# Patient Record
Sex: FEMALE | Race: WHITE
[De-identification: names, ages, dates, MRNs, and addresses within clinical notes are randomized per-mention and may not be internally consistent; named-entity substitution may affect disease eponyms.]

---

## 2017-03-13 ENCOUNTER — HOSPITAL ENCOUNTER (EMERGENCY)
Dept: HOSPITAL 17 - PHED | Age: 37
Discharge: HOME | End: 2017-03-13
Payer: MEDICAID

## 2017-03-13 VITALS — OXYGEN SATURATION: 99 %

## 2017-03-13 VITALS — WEIGHT: 171.96 LBS | HEIGHT: 67 IN | BODY MASS INDEX: 26.99 KG/M2

## 2017-03-13 VITALS
HEART RATE: 91 BPM | DIASTOLIC BLOOD PRESSURE: 86 MMHG | RESPIRATION RATE: 16 BRPM | TEMPERATURE: 98.4 F | SYSTOLIC BLOOD PRESSURE: 122 MMHG | OXYGEN SATURATION: 97 %

## 2017-03-13 VITALS — SYSTOLIC BLOOD PRESSURE: 100 MMHG | DIASTOLIC BLOOD PRESSURE: 65 MMHG

## 2017-03-13 DIAGNOSIS — B96.20: ICD-10-CM

## 2017-03-13 DIAGNOSIS — N76.0: Primary | ICD-10-CM

## 2017-03-13 DIAGNOSIS — R10.30: ICD-10-CM

## 2017-03-13 DIAGNOSIS — R11.0: ICD-10-CM

## 2017-03-13 LAB
ALP SERPL-CCNC: 92 U/L (ref 45–117)
ALT SERPL-CCNC: 40 U/L (ref 10–53)
ANION GAP SERPL CALC-SCNC: 6 MEQ/L (ref 5–15)
AST SERPL-CCNC: 13 U/L (ref 15–37)
BACTERIA #/AREA URNS HPF: (no result) /HPF
BASOPHILS # BLD AUTO: 0.1 TH/MM3 (ref 0–0.2)
BASOPHILS NFR BLD: 0.6 % (ref 0–2)
BILIRUB SERPL-MCNC: 0.5 MG/DL (ref 0.2–1)
BUN SERPL-MCNC: 17 MG/DL (ref 7–18)
CHLORIDE SERPL-SCNC: 106 MEQ/L (ref 98–107)
COLOR UR: YELLOW
COMMENT (UR): (no result)
CULTURE IF INDICATED: (no result)
EOSINOPHIL # BLD: 0.2 TH/MM3 (ref 0–0.4)
EOSINOPHIL NFR BLD: 2.3 % (ref 0–4)
ERYTHROCYTE [DISTWIDTH] IN BLOOD BY AUTOMATED COUNT: 11.8 % (ref 11.6–17.2)
GFR SERPLBLD BASED ON 1.73 SQ M-ARVRAT: 63 ML/MIN (ref 89–?)
GLUCOSE UR STRIP-MCNC: (no result) MG/DL
HCO3 BLD-SCNC: 28.8 MEQ/L (ref 21–32)
HCT VFR BLD CALC: 41.3 % (ref 35–46)
HEMO FLAGS: (no result)
HGB UR QL STRIP: (no result)
KETONES UR STRIP-MCNC: (no result) MG/DL
LEUKOCYTE ESTERASE UR QL STRIP: (no result) /HPF (ref 0–5)
LYMPHOCYTES # BLD AUTO: 2.8 TH/MM3 (ref 1–4.8)
LYMPHOCYTES NFR BLD AUTO: 26.7 % (ref 9–44)
MCH RBC QN AUTO: 30.6 PG (ref 27–34)
MCHC RBC AUTO-ENTMCNC: 34 % (ref 32–36)
MCV RBC AUTO: 90.1 FL (ref 80–100)
MONOCYTES NFR BLD: 5 % (ref 0–8)
MUCOUS THREADS #/AREA URNS LPF: (no result) /LPF
NEUTROPHILS # BLD AUTO: 6.7 TH/MM3 (ref 1.8–7.7)
NEUTROPHILS NFR BLD AUTO: 65.4 % (ref 16–70)
NITRITE UR QL STRIP: (no result)
PLATELET # BLD: 275 TH/MM3 (ref 150–450)
POTASSIUM SERPL-SCNC: 4 MEQ/L (ref 3.5–5.1)
RBC # BLD AUTO: 4.59 MIL/MM3 (ref 4–5.3)
SODIUM SERPL-SCNC: 141 MEQ/L (ref 136–145)
SP GR UR STRIP: 1.02 (ref 1–1.03)
SQUAMOUS #/AREA URNS HPF: (no result) /HPF (ref 0–5)
WBC # BLD AUTO: 10.3 TH/MM3 (ref 4–11)

## 2017-03-13 PROCEDURE — 87186 SC STD MICRODIL/AGAR DIL: CPT

## 2017-03-13 PROCEDURE — 85025 COMPLETE CBC W/AUTO DIFF WBC: CPT

## 2017-03-13 PROCEDURE — 96376 TX/PRO/DX INJ SAME DRUG ADON: CPT

## 2017-03-13 PROCEDURE — 87210 SMEAR WET MOUNT SALINE/INK: CPT

## 2017-03-13 PROCEDURE — 83690 ASSAY OF LIPASE: CPT

## 2017-03-13 PROCEDURE — 81001 URINALYSIS AUTO W/SCOPE: CPT

## 2017-03-13 PROCEDURE — 96375 TX/PRO/DX INJ NEW DRUG ADDON: CPT

## 2017-03-13 PROCEDURE — 80053 COMPREHEN METABOLIC PANEL: CPT

## 2017-03-13 PROCEDURE — 87086 URINE CULTURE/COLONY COUNT: CPT

## 2017-03-13 PROCEDURE — 96374 THER/PROPH/DIAG INJ IV PUSH: CPT

## 2017-03-13 PROCEDURE — 74176 CT ABD & PELVIS W/O CONTRAST: CPT

## 2017-03-13 PROCEDURE — 87077 CULTURE AEROBIC IDENTIFY: CPT

## 2017-03-13 PROCEDURE — 84703 CHORIONIC GONADOTROPIN ASSAY: CPT

## 2017-03-13 PROCEDURE — 99284 EMERGENCY DEPT VISIT MOD MDM: CPT

## 2017-03-13 NOTE — PD
HPI


Chief Complaint:  Gyn Problem/Complaint


Time Seen by Provider:  10:37


Travel History


International Travel<30 days:  No


Contact w/Intl Traveler<30days:  No


Traveled to known affect area:  No





History of Present Illness


HPI


The patient is a 36-year-old  female who presents emergency department 

for abdominal pain.  The patient states she developed abdominal pain last night

, and abdominal pain is located suprapubic and right lower quadrant, radiates 

to the right upper quadrant, associated with nausea, but she denies any 

vomiting.  The patient denies any vaginal discharge, dysuria, frequency, urgency

, or vaginal bleeding.  The patient does have an IUD in place, last missed a 

cycle was 4 years ago.  Patient is .  Patient did eat cookies last night, 

but does not have much of an appetite this morning.  She does have a history of 

previous  section, denies any known history of gallstones.  She does 

have a history of nephrolithiasis with different symptoms in the past.  

Symptoms are moderate, there are no current alleviating or exacerbating factors.





PFSH


Past Medical History


Anxiety:  Yes


Diabetes:  Yes


Patient Takes Glucophage:  Yes


Pregnant?:  Not Pregnant


LMP:  IUD-DOES NOT GET





Past Surgical History


 Section:  Yes





Social History


Alcohol Use:  Yes


Tobacco Use:  Yes


Substance Use:  No





Allergies-Medications


(Allergen,Severity, Reaction):  


Coded Allergies:  


     Latex (Verified  Allergy, Severe, 3/13/17)


Reported Meds & Prescriptions





Reported Meds & Active Scripts


Active


Metformin (Metformin HCl) 500 Mg Tab 500 Mg PO AS DIRECTED


     Take daily with evening meal.  May advance to taking twice a day (w/


     morning meal) after two weeks.


Klonopin (Clonazepam) 0.5 Mg Tab 0.5 Mg PO HS


Reported


Lortab (Hydrocodone-Acetaminophen) 5-325 Mg Tab 1 Tab PO Q4H PRN








Review of Systems


Except as stated in HPI:  all other systems reviewed are Neg


General / Constitutional:  No: Fever


Cardiovascular:  No: Chest Pain or Discomfort


Respiratory:  No: Shortness of Breath


Gastrointestinal:  Positive: Nausea, Abdominal Pain,  No: Vomiting, Diarrhea


Genitourinary:  Positive: Pelvic Pain,  No: Urgency, Frequency, Dysuria, 

Hematuria, Discharge, Vaginal Bleeding


Skin:  No Rash





Physical Exam


Narrative


GENERAL: Awake, alert, pleasant 36-year-old female who appears her stated age 

and is in no acute respiratory distress.


SKIN: Warm and dry.


HEAD: Atraumatic. Normocephalic. 


EYES: No injection or drainage.


ENT: No nasal bleeding or discharge.  Mucous membranes pink and moist.


NECK: Trachea midline. No JVD. 


CARDIOVASCULAR: Regular rate and rhythm.  No murmur appreciated.


RESPIRATORY: No accessory muscle use. Clear to auscultation. Breath sounds 

equal bilaterally. 


GASTROINTESTINAL: Abdomen soft, tender palpation right lower quadrant and 

suprapubic.  Mild guarding.  No rebound tenderness or rigidity noted.


Back: No CVA tenderness.


MUSCULOSKELETAL: No obvious deformities. No clubbing.  No cyanosis.  No edema. 


NEUROLOGICAL: Awake and alert. No obvious cranial nerve deficits.  Motor 

grossly within normal limits. Normal speech.


PSYCHIATRIC: Appropriate mood and affect; insight and judgment normal.





Data


Data


Last Documented VS





Vital Signs








  Date Time  Temp Pulse Resp B/P Pulse Ox O2 Delivery O2 Flow Rate FiO2


 


3/13/17 11:45     99 Room Air  


 


3/13/17 10:02 98.4 91 16 122/86    








Orders





 Complete Blood Count With Diff (3/13/17 10:58)


Comprehensive Metabolic Panel (3/13/17 10:58)


Lipase (3/13/17 10:58)


Urinalysis - C+S If Indicated (3/13/17 10:58)


Ct Abd/Pel W/O Iv Contrast (3/13/17 10:58)


Iv Access Insert/Monitor (3/13/17 10:58)


Ecg Monitoring (3/13/17 10:58)


Oximetry (3/13/17 10:58)


Morphine Inj (Morphine Inj) (3/13/17 11:00)


Ondansetron Inj (Zofran Inj) (3/13/17 11:00)


Sodium Chlor 0.9% 1000 Ml Inj (Ns 1000 M (3/13/17 10:58)


Sodium Chloride 0.9% Flush (Ns Flush) (3/13/17 11:00)


Ketorolac Inj (Toradol Inj) (3/13/17 11:00)


Ed Urine Pregnancytest Poc (3/13/17 10:58)


Urine Culture (3/13/17 11:15)


Wet Prep Profile (3/13/17 12:04)


Morphine Inj (Morphine Inj) (3/13/17 12:15)





Labs





 Laboratory Tests








Test 3/13/17 3/13/17





 11:15 12:17


 


White Blood Count 10.3 TH/MM3 


 


Red Blood Count 4.59 MIL/MM3 


 


Hemoglobin 14.0 GM/DL 


 


Hematocrit 41.3 % 


 


Mean Corpuscular Volume 90.1 FL 


 


Mean Corpuscular Hemoglobin 30.6 PG 


 


Mean Corpuscular Hemoglobin 34.0 % 





Concent  


 


Red Cell Distribution Width 11.8 % 


 


Platelet Count 275 TH/MM3 


 


Mean Platelet Volume 8.2 FL 


 


Neutrophils (%) (Auto) 65.4 % 


 


Lymphocytes (%) (Auto) 26.7 % 


 


Monocytes (%) (Auto) 5.0 % 


 


Eosinophils (%) (Auto) 2.3 % 


 


Basophils (%) (Auto) 0.6 % 


 


Neutrophils # (Auto) 6.7 TH/MM3 


 


Lymphocytes # (Auto) 2.8 TH/MM3 


 


Monocytes # (Auto) 0.5 TH/MM3 


 


Eosinophils # (Auto) 0.2 TH/MM3 


 


Basophils # (Auto) 0.1 TH/MM3 


 


CBC Comment DIFF FINAL  


 


Differential Comment   


 


Urine Color YELLOW  


 


Urine Turbidity CLEAR  


 


Urine pH 5.5  


 


Urine Specific Gravity 1.021  


 


Urine Protein NEG mg/dL 


 


Urine Glucose (UA) NEG mg/dL 


 


Urine Ketones NEG mg/dL 


 


Urine Occult Blood NEG  


 


Urine Nitrite POS  


 


Urine Bilirubin NEG  


 


Urine Leukocyte Esterase NEG  


 


Urine WBC 0-2 /hpf 


 


Urine Squamous Epithelial 0-5 /hpf 





Cells  


 


Urine Bacteria MOD /hpf 


 


Urine Mucus OCC /lpf 


 


Microscopic Urinalysis Comment CULTURE 





 INDICATED 


 


Sodium Level 141 MEQ/L 


 


Potassium Level 4.0 MEQ/L 


 


Chloride Level 106 MEQ/L 


 


Carbon Dioxide Level 28.8 MEQ/L 


 


Anion Gap 6 MEQ/L 


 


Blood Urea Nitrogen 17 MG/DL 


 


Creatinine 1.00 MG/DL 


 


Estimat Glomerular Filtration 63 ML/MIN 





Rate  


 


Random Glucose 138 MG/DL 


 


Calcium Level 8.8 MG/DL 


 


Total Bilirubin 0.5 MG/DL 


 


Aspartate Amino Transf 13 U/L 





(AST/SGOT)  


 


Alanine Aminotransferase 40 U/L 





(ALT/SGPT)  


 


Alkaline Phosphatase 92 U/L 


 


Total Protein 7.4 GM/DL 


 


Albumin 3.9 GM/DL 


 


Lipase 118 U/L 


 


Clue Cells (Wet Prep)  PRESENT 


 


Vaginal Trichomonas (Wet Prep)  NONE SEEN 


 


Vaginal Yeast (Wet Prep)  NONE SEEN 











MDM


Medical Decision Making


Medical Screen Exam Complete:  Yes


Emergency Medical Condition:  Yes


Medical Record Reviewed:  Yes


Interpretation(s)





 Laboratory Tests








Test 3/13/17 3/13/17





 11:15 12:17


 


White Blood Count 10.3 TH/MM3 


 


Red Blood Count 4.59 MIL/MM3 


 


Hemoglobin 14.0 GM/DL 


 


Hematocrit 41.3 % 


 


Mean Corpuscular Volume 90.1 FL 


 


Mean Corpuscular Hemoglobin 30.6 PG 


 


Mean Corpuscular Hemoglobin 34.0 % 





Concent  


 


Red Cell Distribution Width 11.8 % 


 


Platelet Count 275 TH/MM3 


 


Mean Platelet Volume 8.2 FL 


 


Neutrophils (%) (Auto) 65.4 % 


 


Lymphocytes (%) (Auto) 26.7 % 


 


Monocytes (%) (Auto) 5.0 % 


 


Eosinophils (%) (Auto) 2.3 % 


 


Basophils (%) (Auto) 0.6 % 


 


Neutrophils # (Auto) 6.7 TH/MM3 


 


Lymphocytes # (Auto) 2.8 TH/MM3 


 


Monocytes # (Auto) 0.5 TH/MM3 


 


Eosinophils # (Auto) 0.2 TH/MM3 


 


Basophils # (Auto) 0.1 TH/MM3 


 


CBC Comment DIFF FINAL  


 


Differential Comment   


 


Urine Color YELLOW  


 


Urine Turbidity CLEAR  


 


Urine pH 5.5  


 


Urine Specific Gravity 1.021  


 


Urine Protein NEG mg/dL 


 


Urine Glucose (UA) NEG mg/dL 


 


Urine Ketones NEG mg/dL 


 


Urine Occult Blood NEG  


 


Urine Nitrite POS  


 


Urine Bilirubin NEG  


 


Urine Leukocyte Esterase NEG  


 


Urine WBC 0-2 /hpf 


 


Urine Squamous Epithelial 0-5 /hpf 





Cells  


 


Urine Bacteria MOD /hpf 


 


Urine Mucus OCC /lpf 


 


Microscopic Urinalysis Comment CULTURE 





 INDICATED 


 


Sodium Level 141 MEQ/L 


 


Potassium Level 4.0 MEQ/L 


 


Chloride Level 106 MEQ/L 


 


Carbon Dioxide Level 28.8 MEQ/L 


 


Anion Gap 6 MEQ/L 


 


Blood Urea Nitrogen 17 MG/DL 


 


Creatinine 1.00 MG/DL 


 


Estimat Glomerular Filtration 63 ML/MIN 





Rate  


 


Random Glucose 138 MG/DL 


 


Calcium Level 8.8 MG/DL 


 


Total Bilirubin 0.5 MG/DL 


 


Aspartate Amino Transf 13 U/L 





(AST/SGOT)  


 


Alanine Aminotransferase 40 U/L 





(ALT/SGPT)  


 


Alkaline Phosphatase 92 U/L 


 


Total Protein 7.4 GM/DL 


 


Albumin 3.9 GM/DL 


 


Lipase 118 U/L 


 


Clue Cells (Wet Prep)  PRESENT 


 


Vaginal Trichomonas (Wet Prep)  NONE SEEN 


 


Vaginal Yeast (Wet Prep)  NONE SEEN 








Differential Diagnosis


Differential diagnosis includes appendicitis, ovarian torsion, ovarian cyst, 

ectopic pregnancy, pyelonephritis, nephrolithiasis, UTI, PID, cervicitis.


Narrative Course


IV was established, labs are drawn and sent, and the patient was placed on 

cardiac telemetry monitoring and continuous pulse oximetry monitoring.  Bedside 

UA pregnancy test was obtained and UA was sent to lab.  The patient was 

administered morphine, Zofran, Toradol, and IV fluids for her symptoms.  

Laboratory evaluation is essentially unremarkable.  UA does reveal nitrites and 

bacteria, but no wbc's.  CT of the abdomen and pelvis is negative, no evidence 

of appendicitis, nephrolithiasis, diverticulitis, or free fluid.  Pelvic exam 

was performed, IUD appears in place, cervical os is closed with IUD string 

visible, brown discharge in the posterior vaginal vault, wet prep was sent to 

lab.  Wet prep is positive for bacterial vaginosis, therefore, patient will be 

treated with Flagyl twice a day.  She is advised no drinking alcohol while on 

Flagyl, pain medications as directed, to follow-up with her primary physician.  

The patient will be provided a copy of her CT and lab results at discharge.





Diagnosis





 Primary Impression:  


 Bacterial vaginosis


 Additional Impression:  


 Abdominal pain


 Qualified Code:  R10.30 - Lower abdominal pain


Patient Instructions:  General Instructions





***Additional Instructions:


Medications as directed.  Follow-up with her primary physician.  Return if 

symptoms worsen or progress.


***Med/Other Pt SpecificInfo:  Prescription(s) given


Scripts


Hydrocodone-Acetaminophen (Norco)5-325 mg Tab1 Tab PO Q6H PRN (PAIN) #12 TAB  

Ref 0


   Prov:Jc Donald MD         3/13/17 


Ibuprofen 600 Mg Omn255 Mg PO Q6H PRN (Pain/Inflammation) #20 TAB  Ref 0


   Prov:Jc Donald MD         3/13/17 


Metronidazole (Flagyl)500 Mg Ctf826 Mg PO BID  7 Days  Ref 0


   Prov:Jc Donald MD         3/13/17


Disposition:  01 DISCHARGE HOME


Condition:  Stable








Jc Donald MD Mar 13, 2017 11:02

## 2017-04-14 ENCOUNTER — HOSPITAL ENCOUNTER (EMERGENCY)
Dept: HOSPITAL 17 - PHED | Age: 37
Discharge: HOME | End: 2017-04-14
Payer: MEDICAID

## 2017-04-14 VITALS
OXYGEN SATURATION: 100 % | HEART RATE: 76 BPM | RESPIRATION RATE: 16 BRPM | DIASTOLIC BLOOD PRESSURE: 67 MMHG | SYSTOLIC BLOOD PRESSURE: 125 MMHG

## 2017-04-14 VITALS — WEIGHT: 168.65 LBS | BODY MASS INDEX: 26.47 KG/M2 | HEIGHT: 67 IN

## 2017-04-14 VITALS
SYSTOLIC BLOOD PRESSURE: 151 MMHG | HEART RATE: 101 BPM | OXYGEN SATURATION: 97 % | RESPIRATION RATE: 18 BRPM | DIASTOLIC BLOOD PRESSURE: 117 MMHG | TEMPERATURE: 98.6 F

## 2017-04-14 DIAGNOSIS — E11.9: ICD-10-CM

## 2017-04-14 DIAGNOSIS — F17.210: ICD-10-CM

## 2017-04-14 DIAGNOSIS — J40: Primary | ICD-10-CM

## 2017-04-14 DIAGNOSIS — R06.02: ICD-10-CM

## 2017-04-14 LAB
ALP SERPL-CCNC: 102 U/L (ref 45–117)
ALT SERPL-CCNC: 45 U/L (ref 10–53)
ANION GAP SERPL CALC-SCNC: 11 MEQ/L (ref 5–15)
AST SERPL-CCNC: 17 U/L (ref 15–37)
BASOPHILS # BLD AUTO: 0 TH/MM3 (ref 0–0.2)
BASOPHILS NFR BLD: 0.3 % (ref 0–2)
BETA HCG QUANT: (no result) MIU/ML (ref 0–5)
BILIRUB SERPL-MCNC: 0.7 MG/DL (ref 0.2–1)
BUN SERPL-MCNC: 16 MG/DL (ref 7–18)
CHLORIDE SERPL-SCNC: 105 MEQ/L (ref 98–107)
EOSINOPHIL # BLD: 0.2 TH/MM3 (ref 0–0.4)
EOSINOPHIL NFR BLD: 1.1 % (ref 0–4)
ERYTHROCYTE [DISTWIDTH] IN BLOOD BY AUTOMATED COUNT: 11.8 % (ref 11.6–17.2)
GFR SERPLBLD BASED ON 1.73 SQ M-ARVRAT: 68 ML/MIN (ref 89–?)
HCO3 BLD-SCNC: 24 MEQ/L (ref 21–32)
HCT VFR BLD CALC: 40.1 % (ref 35–46)
HEMO FLAGS: (no result)
LYMPHOCYTES # BLD AUTO: 2.9 TH/MM3 (ref 1–4.8)
LYMPHOCYTES NFR BLD AUTO: 20.7 % (ref 9–44)
MCH RBC QN AUTO: 31 PG (ref 27–34)
MCHC RBC AUTO-ENTMCNC: 34.6 % (ref 32–36)
MCV RBC AUTO: 89.7 FL (ref 80–100)
MONOCYTES NFR BLD: 5.1 % (ref 0–8)
NEUTROPHILS # BLD AUTO: 10.4 TH/MM3 (ref 1.8–7.7)
NEUTROPHILS NFR BLD AUTO: 72.8 % (ref 16–70)
PLATELET # BLD: 277 TH/MM3 (ref 150–450)
POTASSIUM SERPL-SCNC: 4 MEQ/L (ref 3.5–5.1)
RBC # BLD AUTO: 4.47 MIL/MM3 (ref 4–5.3)
SODIUM SERPL-SCNC: 140 MEQ/L (ref 136–145)
WBC # BLD AUTO: 14.2 TH/MM3 (ref 4–11)

## 2017-04-14 PROCEDURE — 71020: CPT

## 2017-04-14 PROCEDURE — 83690 ASSAY OF LIPASE: CPT

## 2017-04-14 PROCEDURE — 85025 COMPLETE CBC W/AUTO DIFF WBC: CPT

## 2017-04-14 PROCEDURE — 99283 EMERGENCY DEPT VISIT LOW MDM: CPT

## 2017-04-14 PROCEDURE — 80053 COMPREHEN METABOLIC PANEL: CPT

## 2017-04-14 PROCEDURE — 96361 HYDRATE IV INFUSION ADD-ON: CPT

## 2017-04-14 PROCEDURE — 96374 THER/PROPH/DIAG INJ IV PUSH: CPT

## 2017-04-14 PROCEDURE — 84702 CHORIONIC GONADOTROPIN TEST: CPT

## 2017-04-14 NOTE — RADHPO
EXAM DATE/TIME:  04/14/2017 10:54 

 

[facility] COMPARISON:     

CHEST SINGLE AP, March 16, 2016, 19:41.

 

                     

INDICATIONS :     

Shortness of breath, cough.

                     

 

MEDICAL HISTORY :     

Diabetes mellitus type II.       Smoker.   

 

SURGICAL HISTORY :     

None.   

 

ENCOUNTER:     

Initial                                        

 

ACUITY:     

1 month      

 

PAIN SCORE:     

0/10

 

LOCATION:     

Bilateral chest 

 

FINDINGS:     

PA and lateral views of the chest demonstrate a normal-sized cardiac silhouette. There is no effusion
, consolidation, or pneumothorax. The bones and soft tissues demonstrate no acute abnormality.

 

CONCLUSION:     Normal chest x-ray

 

 

 

 Edilberto Prince MD on April 14, 2017 at 11:22           

Board Certified Radiologist.

 This report was verified electronically.

## 2017-04-14 NOTE — PD
HPI


Chief Complaint:  Cold / Flu Symptoms


Time Seen by Provider:  10:34


Travel History


International Travel<30 days:  No


Contact w/Intl Traveler<30days:  No


Traveled to known affect area:  No





History of Present Illness


HPI


So 36-year-old woman who presents to the emergency department complaining of 

being sick for the past month and a half.  She started to develop cough, sore 

throat a little bit of fevers initially.  Fevers are resolved.  She has 

persistent hard cough.  She also has a lot of congestion.  Cough is productive 

of thick phlegm.  She also developed some right flank pain this morning.  She 

lost her voice today.  She has some vomiting in the morning that she attributes 

to the thick phlegm.  She otherwise has been feeling generally ill.  Today she 

also started to get some irritation and inflammation in her left eye.  No sick 

contacts.  Only medical history is diabetes.  No other complaints.





History


Past Medical History


*** Narrative Medical


Diabetes


Tetanus Vaccination:  Unknown


Influenza Vaccination:  No


LMP:  IUD/not menstruating





Social History


Alcohol Use:  Yes (Rarely)


Tobacco Use:  Yes (1/2 PPD)





Allergies-Medications


(Allergen,Severity, Reaction):  


Coded Allergies:  


     Latex (Verified  Allergy, Severe, 4/14/17)


Reported Meds & Prescriptions





Reported Meds & Active Scripts


Active


Erythromycin Opth Oint 5 Mg/Gm Oint 1 Applic LEFT EYE QID


Zithromax Z-Roldan (Azithromycin) 250 Mg Dspk 250 Mg PO AS DIRECTED


     500 MG (2 tabs) day 1, then 1 tab days 2-5.


Naprosyn (Naproxen) 500 Mg Tab 500 Mg PO BID PRN


Metformin (Metformin HCl) 500 Mg Tab 500 Mg PO AS DIRECTED


     Take daily with evening meal.  May advance to taking twice a day (w/


     morning meal) after two weeks.








Review of Systems


Except as stated in HPI:  all other systems reviewed are Neg





Physical Exam


Narrative


GENERAL: Well-appearing 36-year-old woman, no acute distress.


SKIN: Focused skin assessment warm/dry.


HEAD: Atraumatic. Normocephalic. 


EYES: Left eye with a little bit of scleral injection.


ENT: No nasal bleeding or discharge.  Mucous membranes pink and moist.


NECK: Trachea midline. No JVD. 


CARDIOVASCULAR: Regular rate and rhythm.  No murmur appreciated.


RESPIRATORY: No accessory muscle use. Clear to auscultation. Breath sounds 

equal bilaterally. 


GASTROINTESTINAL: Abdomen flat and soft.  Some tenderness to palpation of the 

right costal margin.


NEUROLOGICAL: Awake and alert. No obvious cranial nerve deficits.  Motor 

grossly within normal limits. Normal speech.


PSYCHIATRIC: Appropriate mood and affect; insight and judgment normal.





Data


Data


Last Documented VS





Vital Signs








  Date Time  Temp Pulse Resp B/P Pulse Ox O2 Delivery O2 Flow Rate FiO2


 


4/14/17 10:35  101 18  97 Room Air  


 


4/14/17 10:29 98.6   151/117    








Orders





 Complete Blood Count With Diff (4/14/17 10:49)


Comprehensive Metabolic Panel (4/14/17 10:49)


Lipase (4/14/17 10:49)


Iv Access Insert/Monitor (4/14/17 10:49)


Beta Hcg (Quant/Titer) (4/14/17 10:49)


Ed Poc Ultrasound (4/14/17 )


Chest, Pa & Lat (4/14/17 )


Sodium Chlor 0.9% 1000 Ml Inj (Ns 1000 M (4/14/17 11:00)


Ketorolac Inj (Toradol Inj) (4/14/17 11:15)





Labs








 Laboratory Tests








Test 4/14/17





 10:55


 


White Blood Count 14.2 TH/MM3


 


Red Blood Count 4.47 MIL/MM3


 


Hemoglobin 13.9 GM/DL


 


Hematocrit 40.1 %


 


Mean Corpuscular Volume 89.7 FL


 


Mean Corpuscular Hemoglobin 31.0 PG


 


Mean Corpuscular Hemoglobin 34.6 %





Concent 


 


Red Cell Distribution Width 11.8 %


 


Platelet Count 277 TH/MM3


 


Mean Platelet Volume 8.3 FL


 


Neutrophils (%) (Auto) 72.8 %


 


Lymphocytes (%) (Auto) 20.7 %


 


Monocytes (%) (Auto) 5.1 %


 


Eosinophils (%) (Auto) 1.1 %


 


Basophils (%) (Auto) 0.3 %


 


Neutrophils # (Auto) 10.4 TH/MM3


 


Lymphocytes # (Auto) 2.9 TH/MM3


 


Monocytes # (Auto) 0.7 TH/MM3


 


Eosinophils # (Auto) 0.2 TH/MM3


 


Basophils # (Auto) 0.0 TH/MM3


 


CBC Comment DIFF FINAL 


 


Differential Comment  


 


Sodium Level 140 MEQ/L


 


Potassium Level 4.0 MEQ/L


 


Chloride Level 105 MEQ/L


 


Carbon Dioxide Level 24.0 MEQ/L


 


Anion Gap 11 MEQ/L


 


Blood Urea Nitrogen 16 MG/DL


 


Creatinine 0.93 MG/DL


 


Estimat Glomerular Filtration 68 ML/MIN





Rate 


 


Random Glucose 155 MG/DL


 


Calcium Level 9.0 MG/DL


 


Total Bilirubin 0.7 MG/DL


 


Aspartate Amino Transf 17 U/L





(AST/SGOT) 


 


Alanine Aminotransferase 45 U/L





(ALT/SGPT) 


 


Alkaline Phosphatase 102 U/L


 


Total Protein 7.5 GM/DL


 


Albumin 3.9 GM/DL


 


Lipase 123 U/L


 


Human Chorionic Gonadotropin, LESS THAN 1





Quant MIU/ML














MDM


Medical Decision Making


Medical Screen Exam Complete:  Yes


Emergency Medical Condition:  Yes


Interpretation(s)


LABS:


CBC remarkable for mild leukocytosis.


CMP is unremarkable.


Lipase is normal


HCG negative





Chest x-ray negative


Differential Diagnosis


URI, cough, congestion, bronchitis, other


Narrative Course


Medical decision making





36-year-old woman presents to the emergency department complaining of a month 

and a half of cough congestion sore throat associated with now some loss of 

voice and some mild conjunctival injection.  Suspect bronchitis and upper 

respiratory infection.  Symptoms been ongoing for month and a half.  She has 

some tenderness in the right upper quadrant.  I checked an ultrasound the 

gallbladder which is unremarkable.  We'll check an x-ray, labs, IV fluids, 

likely antibiotic treatment for acute bronchitis.  We'll give her prescription 

for erythromycin of her conjunctivitis symptoms worsen.





Diagnosis





 Primary Impression:  


 Bronchitis





***Additional Instructions:


Take Naprosyn as needed for pain and body aches.





Take azithromycin as prescribed.





Use erythromycin eye ointment if you have any worsening eye redness, or eye 

drainage.





Drink plenty of fluids stay well-hydrated.





Return to the emergency department new or worsening symptoms.





Follow-up with your primary doctor in 1-10 days every not feeling completely 

well.


***Med/Other Pt SpecificInfo:  Prescription(s) given


Scripts


Erythromycin Opth Oint 5 Mg/Gm Oint1 Applic LEFT EYE QID  #1 TUBE


   Prov:Brad Matute MD         4/14/17 


Azithromycin (Zithromax Z-Roldan)250 Mg [id number] Mg PO AS DIRECTED  #1 DSPK


   500 MG (2 tabs) day 1, then 1 tab days 2-5.


   Prov:Brad Matute MD         4/14/17 


Naproxen (Naprosyn)500 Mg Dpp594 Mg PO BID PRN (PAIN SCALE 1 TO 10) #20 TAB


   Prov:Brad Matute MD         4/14/17


Disposition:  01 DISCHARGE HOME


Condition:  Stable








Brad Matute MD Apr 14, 2017 11:09

## 2017-06-03 ENCOUNTER — HOSPITAL ENCOUNTER (EMERGENCY)
Dept: HOSPITAL 17 - NEPC | Age: 37
LOS: 1 days | Discharge: HOME | End: 2017-06-04
Payer: MEDICAID

## 2017-06-03 VITALS
OXYGEN SATURATION: 98 % | DIASTOLIC BLOOD PRESSURE: 86 MMHG | RESPIRATION RATE: 18 BRPM | HEART RATE: 112 BPM | TEMPERATURE: 98.2 F | SYSTOLIC BLOOD PRESSURE: 127 MMHG

## 2017-06-03 VITALS — WEIGHT: 187.39 LBS | HEIGHT: 66 IN | BODY MASS INDEX: 30.12 KG/M2

## 2017-06-03 DIAGNOSIS — T42.4X2A: ICD-10-CM

## 2017-06-03 DIAGNOSIS — S00.03XA: Primary | ICD-10-CM

## 2017-06-03 DIAGNOSIS — F41.9: ICD-10-CM
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
## 2017-06-03 DIAGNOSIS — F17.210: ICD-10-CM

## 2017-06-03 DIAGNOSIS — F43.25: ICD-10-CM

## 2017-06-03 LAB
AMPHETAMINE, URINE: (no result)
BARBITURATES, URINE: (no result)
BASOPHILS # BLD AUTO: 0.1 TH/MM3 (ref 0–0.2)
BASOPHILS NFR BLD: 0.3 % (ref 0–2)
COCAINE UR-MCNC: (no result) NG/ML
EOSINOPHIL # BLD: 0.1 TH/MM3 (ref 0–0.4)
EOSINOPHIL NFR BLD: 0.5 % (ref 0–4)
ERYTHROCYTE [DISTWIDTH] IN BLOOD BY AUTOMATED COUNT: 12.8 % (ref 11.6–17.2)
HCT VFR BLD CALC: 42.2 % (ref 35–46)
HEMO FLAGS: (no result)
LYMPHOCYTES # BLD AUTO: 2.1 TH/MM3 (ref 1–4.8)
LYMPHOCYTES NFR BLD AUTO: 9.4 % (ref 9–44)
MCH RBC QN AUTO: 31.3 PG (ref 27–34)
MCHC RBC AUTO-ENTMCNC: 34.5 % (ref 32–36)
MCV RBC AUTO: 90.8 FL (ref 80–100)
MONOCYTES NFR BLD: 5 % (ref 0–8)
NEUTROPHILS # BLD AUTO: 19 TH/MM3 (ref 1.8–7.7)
NEUTROPHILS NFR BLD AUTO: 84.8 % (ref 16–70)
PLATELET # BLD: 243 TH/MM3 (ref 150–450)
RBC # BLD AUTO: 4.64 MIL/MM3 (ref 4–5.3)
WBC # BLD AUTO: 22.5 TH/MM3 (ref 4–11)

## 2017-06-03 PROCEDURE — 72125 CT NECK SPINE W/O DYE: CPT

## 2017-06-03 PROCEDURE — 70450 CT HEAD/BRAIN W/O DYE: CPT

## 2017-06-03 PROCEDURE — 85025 COMPLETE CBC W/AUTO DIFF WBC: CPT

## 2017-06-03 PROCEDURE — 80307 DRUG TEST PRSMV CHEM ANLYZR: CPT

## 2017-06-03 PROCEDURE — 81001 URINALYSIS AUTO W/SCOPE: CPT

## 2017-06-03 PROCEDURE — 80053 COMPREHEN METABOLIC PANEL: CPT

## 2017-06-03 PROCEDURE — 84703 CHORIONIC GONADOTROPIN ASSAY: CPT

## 2017-06-03 NOTE — PD
HPI


.


Head injury


Chief Complaint:  head injury


Time Seen by Provider:  23:02


Travel History


International Travel<30 days:  No


Contact w/Intl Traveler<30days:  No





History of Present Illness


HPI


Patient presents to us via [facility] after a reported head injury.  She states that 

she was standing on her bed and she fell and struck her head on the ceiling 

fan.  She has reportedly had several syncopal events since this.





The patient is very angry and is not willing to cooperate for history and 

physical.





Family reports that the patient old gun to her head threatening to kill 

herself.  Family further reports that she took too many of her benzodiazepine 

pills.





PFSH


Past Medical History


Hx Anticoagulant Therapy:  No


Anxiety:  Yes


Diabetes:  Yes (Type 2)





Past Surgical History


 Section:  Yes





Social History


Alcohol Use:  Yes (Rarely)


Tobacco Use:  Yes (1/2 PPD)


Substance Use:  No





Allergies-Medications


(Allergen,Severity, Reaction):  


Coded Allergies:  


     Latex (Verified  Allergy, Severe, 17)


Reported Meds & Prescriptions





Reported Meds & Active Scripts


Active


Januvia (Sitagliptin Phosphate) 100 Mg Tab 100 Mg PO DAILY


Reported


Klonopin (Clonazepam) 0.5 Mg Tab 0.5 Mg PO HS








Review of Systems


ROS Limitations:  Uncooperative





Physical Exam


Narrative


GENERAL: The patient is awake and alert.  She is very angry and uncooperative.


SKIN: Warm and dry.  Some superficial scrapes noted on the left wrist.


HEAD: Contusion palpated on the left side of her scalp.  Normocephalic. 


EYES: Pupils equal and round.  Extraocular movements are intact.


ENT: No nasal bleeding or discharge.  Mucous membranes pink and moist.


NECK: Trachea midline.  Neck is currently immobilized with a cervical collar.


CARDIOVASCULAR: Regular rate and rhythm.  Heart sounds are normal.


RESPIRATORY: No accessory muscle use. 


GASTROINTESTINAL: Abdomen soft, non-tender, nondistended. 


MUSCULOSKELETAL: No obvious deformities.   No edema.  She does have some 

tenderness to percussion in her low back.


NEUROLOGICAL: Awake and alert. No obvious cranial nerve deficits.  Moves all 4 

extremities equally.


PSYCHIATRIC: Very angry and uncooperative.  Patient denies any homicidal or 

suicidal ideation.





Data


Data


Last Documented VS





Vital Signs








  Date Time  Temp Pulse Resp B/P Pulse Ox O2 Delivery O2 Flow Rate FiO2


 


17 01:34  58 18 121/76 98 Room Air  


 


6/3/17 23:07 98.2       








Orders





 Complete Blood Count With Diff (6/3/17 23:02)


Comprehensive Metabolic Panel (6/3/17 23:02)


Psych Screen (6/3/17 23:02)


Restraints Non-Violent IRAJ.Q3H (6/3/17 23:02)


Drug Screen, Random Urine (6/3/17 23:02)


Alcohol (Ethanol) (6/3/17 23:02)


Ct Brain W/O Iv Contrast(Rout) (6/3/17 23:02)


Ct Cerv Spine W/O Contrast (6/3/17 23:02)


Ed Urine Pregnancytest Poc (6/3/17 23:02)


Urinalysis - C+S If Indicated (17 00:13)





Labs





 Laboratory Tests








Test 6/3/17





 23:10


 


White Blood Count 22.5 TH/MM3


 


Red Blood Count 4.64 MIL/MM3


 


Hemoglobin 14.5 GM/DL


 


Hematocrit 42.2 %


 


Mean Corpuscular Volume 90.8 FL


 


Mean Corpuscular Hemoglobin 31.3 PG


 


Mean Corpuscular Hemoglobin 34.5 %





Concent 


 


Red Cell Distribution Width 12.8 %


 


Platelet Count 243 TH/MM3


 


Mean Platelet Volume 9.1 FL


 


Neutrophils (%) (Auto) 84.8 %


 


Lymphocytes (%) (Auto) 9.4 %


 


Monocytes (%) (Auto) 5.0 %


 


Eosinophils (%) (Auto) 0.5 %


 


Basophils (%) (Auto) 0.3 %


 


Neutrophils # (Auto) 19.0 TH/MM3


 


Lymphocytes # (Auto) 2.1 TH/MM3


 


Monocytes # (Auto) 1.1 TH/MM3


 


Eosinophils # (Auto) 0.1 TH/MM3


 


Basophils # (Auto) 0.1 TH/MM3


 


CBC Comment DIFF FINAL 


 


Differential Comment  


 


Urine Opiates Screen NEG 


 


Urine Barbiturates Screen NEG 


 


Urine Amphetamines Screen NEG 


 


Urine Benzodiazepines Screen NEG 


 


Urine Cocaine Screen NEG 


 


Urine Cannabinoids Screen NEG 


 


Urine Color YELLOW 


 


Urine Turbidity HAZY 


 


Urine pH 5.0 


 


Urine Specific Gravity 1.030 


 


Urine Protein TRACE mg/dL


 


Urine Glucose (UA) 1000 mg/dL


 


Urine Ketones TRACE mg/dL


 


Urine Occult Blood NEG 


 


Urine Nitrite NEG 


 


Urine Bilirubin NEG 


 


Urine Urobilinogen 2.0 MG/DL


 


Urine Leukocyte Esterase NEG 


 


Urine RBC LESS THAN 1





 /hpf


 


Urine WBC 1 /hpf


 


Urine Squamous Epithelial 1 /hpf





Cells 


 


Urine Calcium Oxalate Crystals FEW /hpf


 


Urine Mucus FEW /lpf


 


Microscopic Urinalysis Comment CULT NOT





 INDICATED


 


Sodium Level 140 MEQ/L


 


Potassium Level 3.8 MEQ/L


 


Chloride Level 106 MEQ/L


 


Carbon Dioxide Level 23.3 MEQ/L


 


Anion Gap 11 MEQ/L


 


Blood Urea Nitrogen 18 MG/DL


 


Creatinine 1.31 MG/DL


 


Estimat Glomerular Filtration 46 ML/MIN





Rate 


 


Random Glucose 205 MG/DL


 


Calcium Level 9.1 MG/DL


 


Total Bilirubin 0.6 MG/DL


 


Aspartate Amino Transf 17 U/L





(AST/SGOT) 


 


Alanine Aminotransferase 33 U/L





(ALT/SGPT) 


 


Alkaline Phosphatase 105 U/L


 


Total Protein 7.6 GM/DL


 


Albumin 4.1 GM/DL


 


Ethyl Alcohol Level LESS THAN 3





 MG/DL











MDM


Medical Decision Making


Medical Screen Exam Complete:  Yes


Emergency Medical Condition:  Yes


Differential Diagnosis


My differential diagnosis of head trauma includes but is not limited to scalp 

contusion, concussion, intracerebral hemorrhage.





Differential diagnosis includes but is not limited to depression with suicidal 

gesture, suicide attempt, suicidal ideation, attention seeking behavior.


Narrative Course


This patient presents with a chief complaint of head injury.  She has 

reportedly had several episodes of syncope.  She has also reportedly overdosed 

on a benzodiazepine.  Furthermore, she has reportedly had a significant 

suicidal gesture in that she was holding rebound.  She has also cut her left 

wrist.  She is extremely angry and uncooperative.  For these reasons, I have 

signed a [employer] Act.





CBC & BMP Diagram


6/3/17 23:10











UA and drug screen are negative.





CT of her head and C-spine are negative.





This patient is medically clear for psychiatric evaluation.





Diagnosis





 Primary Impression:  


 Scalp contusion


 Additional Impression:  


 Suicide gesture


 Qualified Code:  X83.8XXA - Suicide gesture, initial encounter


Condition:  Stable








Tami Leigh MD Emanuel 3, 2017 23:14

## 2017-06-04 VITALS
SYSTOLIC BLOOD PRESSURE: 121 MMHG | RESPIRATION RATE: 18 BRPM | OXYGEN SATURATION: 98 % | DIASTOLIC BLOOD PRESSURE: 76 MMHG | HEART RATE: 58 BPM

## 2017-06-04 VITALS — SYSTOLIC BLOOD PRESSURE: 124 MMHG | DIASTOLIC BLOOD PRESSURE: 78 MMHG | RESPIRATION RATE: 18 BRPM | HEART RATE: 96 BPM

## 2017-06-04 VITALS — SYSTOLIC BLOOD PRESSURE: 132 MMHG | TEMPERATURE: 98 F | DIASTOLIC BLOOD PRESSURE: 68 MMHG

## 2017-06-04 LAB
ALP SERPL-CCNC: 105 U/L (ref 45–117)
ALT SERPL-CCNC: 33 U/L (ref 10–53)
ANION GAP SERPL CALC-SCNC: 11 MEQ/L (ref 5–15)
AST SERPL-CCNC: 17 U/L (ref 15–37)
BILIRUB SERPL-MCNC: 0.6 MG/DL (ref 0.2–1)
BUN SERPL-MCNC: 18 MG/DL (ref 7–18)
CHLORIDE SERPL-SCNC: 106 MEQ/L (ref 98–107)
COLOR UR: YELLOW
COMMENT (UR): (no result)
CULTURE IF INDICATED: (no result)
GFR SERPLBLD BASED ON 1.73 SQ M-ARVRAT: 46 ML/MIN (ref 89–?)
GLUCOSE UR STRIP-MCNC: 1000 MG/DL
HCO3 BLD-SCNC: 23.3 MEQ/L (ref 21–32)
HGB UR QL STRIP: (no result)
KETONES UR STRIP-MCNC: (no result) MG/DL
MUCOUS THREADS #/AREA URNS LPF: (no result) /LPF
NITRITE UR QL STRIP: (no result)
POTASSIUM SERPL-SCNC: 3.8 MEQ/L (ref 3.5–5.1)
SODIUM SERPL-SCNC: 140 MEQ/L (ref 136–145)
SP GR UR STRIP: 1.03 (ref 1–1.03)
SQUAMOUS #/AREA URNS HPF: 1 /HPF (ref 0–5)

## 2017-06-04 NOTE — RADRPT
EXAM DATE/TIME:  2017 01:54 

 

[facility] COMPARISON:     

No previous studies available for comparison.

 

 

INDICATIONS :     

Trauma, hit head.

                      

 

RADIATION DOSE:     

56.35 CTDIvol (mGy) 

 

 

 

MEDICAL HISTORY :     

Diabetes mellitus type 2.  

 

SURGICAL HISTORY :      

 section. 

 

ENCOUNTER:      

Initial

 

ACUITY:      

1 day

 

PAIN SCALE:      

8/10

 

LOCATION:        

cranial 

 

TECHNIQUE:     

Multiple contiguous axial images were obtained of the head.  Using automated exposure control and adj
ustment of the mA and/or kV according to patient size, radiation dose was kept as low as reasonably a
chievable to obtain optimal diagnostic quality images. 

 

FINDINGS:     

There is no evidence for intracranial hemorrhage, mass effect, mass lesions, edema, or extra-axial fl
uid collections.  The visualized bony structures appear intact.  The ventricles are normal size for t
he patient's age.  There are no signs of acute infarction for technique. 

 

CONCLUSION:          Unremarkable study.

 

 

 

 MARCIO Cespedes MD on 2017 at 2:09           

Board Certified Radiologist.

 This report was verified electronically.

## 2017-06-04 NOTE — PD
__________________________________________________





History of Present Illness


Chief Complaint:  Head Injury


Time Seen by Provider:  13:30


Travel History


International Travel<30 Days:  No


Contact w/Intl Traveler<30days:  No


Known affected area:  No





Legal Status


Legal Status:  Baker Act


Baker Act Signed By:  Birgit CHIN


History of Present Illness:


This is a 36-year-old female who is very pleasant and wishing this physician 

would lift her Parekh act to allow her to go home.  This physician reviewed the 

Parekh act with the patient and apparently there is no gun, according to the 

patient and the nurse, used in this reported suicide threat.  At this time, the 

patient is calm and cooperative.  She has no suicidal or homicidal ideation, 

plan or intent.  She demonstrates no psychotic thinking and her cognition is 

intact.  She is verbally concepcion for safety.  She would like to be seen on 

an outpatient basis rather than admitted to the hospital.





PFSH


Past Medical History


Hx Anticoagulant Therapy:  No


Anxiety:  Yes


Diabetes:  Yes (Type 2)


Patient Takes Glucophage:  No


Immunizations Current:  No


Tetanus Vaccination:  > 5 Years


Influenza Vaccination:  No


Pregnant?:  Unknown





Past Surgical History


 Section:  Yes





Psychiatric History


Psychiatric History


Hx Psychiatric Treatment:  


Patient denies any psych treatment.


History of Inpatient Treatment:  No


Guns or firearms in home:  No





Social History


Hx Alcohol Use:  Yes (Rarely)


Hx Tobacco Use:  Yes (1/2 PPD)


Hx Substance Use:  No


Hx of Substance Use Treatment:  No





Allergies-Medications


(Allergen,Severity, Reaction):  


Coded Allergies:  


     Latex (Verified  Allergy, Severe, 17)


Reported Meds & Prescriptions





Reported Meds & Active Scripts


Active


Januvia (Sitagliptin Phosphate) 100 Mg Tab 100 Mg PO DAILY


Reported


Klonopin (Clonazepam) 0.5 Mg Tab 0.5 Mg PO HS





Review of Systems


Except as stated in HPI:  all other systems reviewed are Neg





Exam


Alert:  Yes


[place]:  Person, Place, Date, Situation


Mood:  Calm


Affect:  Appropriate, Euthymic


Speech:  Clear, Logical


Eye Contact:  Normal


Memory Intact:  Immediate, Recent, Remote, Comment


Insight/Judgement


Adequate





[facility]


Medical Decision Making


Medical Record Reviewed:  Yes


Assessment/Plan


Patient's Baker act is being lifted and she is being discharged home.  She is 

cognitively intact, without suicidal or homicidal ideation or psychotic 

symptoms.  She is competent to contract for safety.  She is willing to be seen 

on an outpatient basis and therefore least restrictive alternative applies.





Orders





 Complete Blood Count With Diff (6/3/17 23:02)


Comprehensive Metabolic Panel (6/3/17 23:02)


Psych Screen (6/3/17 23:02)


Restraints Non-Violent IRAJ.[id number] (6/3/17 23:02)


Drug Screen, Random Urine (6/3/17 23:02)


Alcohol (Ethanol) (6/3/17 23:02)


Ct Brain W/O Iv Contrast(Rout) (6/3/17 23:02)


Ct Cerv Spine W/O Contrast (6/3/17 23:02)


Ed Urine Pregnancytest Poc (6/3/17 23:02)


Urinalysis - C+S If Indicated (17 00:13)


Diet Diabetic (17 Breakfast)


Diet Regular Basic (17 Dinner)


Clonazepam (Klonopin) (17 10:00)


Sitagliptin (Januvia) (17 10:00)


Diet Regular Basic (17 Lunch)





Results





Vital Signs








  Date Time  Temp Pulse Resp B/P Pulse Ox O2 Delivery O2 Flow Rate FiO2


 


17 10:16  96 18 124/78  Room Air  


 


17 01:34  58 18 121/76 98 Room Air  


 


6/3/17 23:07 98.2 112 18 127/86 98   











Laboratory Tests








Test 6/3/17





 23:10


 


White Blood Count 22.5 


 


Red Blood Count 4.64 


 


Hemoglobin 14.5 


 


Hematocrit 42.2 


 


Mean Corpuscular Volume 90.8 


 


Mean Corpuscular Hemoglobin 31.3 


 


Mean Corpuscular Hemoglobin 34.5 





Concent 


 


Red Cell Distribution Width 12.8 


 


Platelet Count 243 


 


Mean Platelet Volume 9.1 


 


Neutrophils (%) (Auto) 84.8 


 


Lymphocytes (%) (Auto) 9.4 


 


Monocytes (%) (Auto) 5.0 


 


Eosinophils (%) (Auto) 0.5 


 


Basophils (%) (Auto) 0.3 


 


Neutrophils # (Auto) 19.0 


 


Lymphocytes # (Auto) 2.1 


 


Monocytes # (Auto) 1.1 


 


Eosinophils # (Auto) 0.1 


 


Basophils # (Auto) 0.1 


 


CBC Comment DIFF FINAL 


 


Differential Comment  


 


Urine Opiates Screen NEG 


 


Urine Barbiturates Screen NEG 


 


Urine Amphetamines Screen NEG 


 


Urine Benzodiazepines Screen NEG 


 


Urine Cocaine Screen NEG 


 


Urine Cannabinoids Screen NEG 


 


Urine Color YELLOW 


 


Urine Turbidity HAZY 


 


Urine pH 5.0 


 


Urine Specific Gravity 1.030 


 


Urine Protein TRACE 


 


Urine Glucose (UA) 1000 


 


Urine Ketones TRACE 


 


Urine Occult Blood NEG 


 


Urine Nitrite NEG 


 


Urine Bilirubin NEG 


 


Urine Urobilinogen 2.0 


 


Urine Leukocyte Esterase NEG 


 


Urine RBC LESS THAN 1 


 


Urine WBC 1 


 


Urine Squamous Epithelial 1 





Cells 


 


Urine Calcium Oxalate Crystals FEW 


 


Urine Mucus FEW 


 


Microscopic Urinalysis Comment CULT NOT





 INDICATED


 


Sodium Level 140 


 


Potassium Level 3.8 


 


Chloride Level 106 


 


Carbon Dioxide Level 23.3 


 


Anion Gap 11 


 


Blood Urea Nitrogen 18 


 


Creatinine 1.31 


 


Estimat Glomerular Filtration 46 





Rate 


 


Random Glucose 205 


 


Calcium Level 9.1 


 


Total Bilirubin 0.6 


 


Aspartate Amino Transf 17 





(AST/SGOT) 


 


Alanine Aminotransferase 33 





(ALT/SGPT) 


 


Alkaline Phosphatase 105 


 


Total Protein 7.6 


 


Albumin 4.1 


 


Ethyl Alcohol Level LESS THAN 3 











Diagnosis





 Primary Impression:  


 Adjustment disorder with mixed disturbance of emotions and conduct


Condition:  Stable








Arturo Rosen MD 2017 13:47

## 2017-06-04 NOTE — RADRPT
EXAM DATE/TIME:  2017 01:55 

 

[facility] COMPARISON:     

No previous studies available for comparison.

 

 

INDICATIONS :     

Trauma, hit head. 

                      

 

RADIATION DOSE:     

33.75 CTDIvol (mGy) 

 

 

 

MEDICAL HISTORY :     

Diabetes mellitus type 2.  

 

SURGICAL HISTORY :      

 section. 

 

ENCOUNTER:      

Initial

 

ACUITY:      

1 day

 

PAIN SCALE:      

0/10

 

LOCATION:        

neck 

 

TECHNIQUE:     

Volumetric scanning of the cervical spine was performed. Multiplanar reconstructions in the sagittal,
 coronal and oblique axial planes were performed.   Using automated exposure control and adjustment o
f the mA and/or kV according to patient size, radiation dose was kept as low as reasonably achievable
 to obtain optimal diagnostic quality images. 

 

FINDINGS:     

No significant subluxation or soft tissue swelling is seen. No definite fracture is seen for techniqu
e.

 

 

C2-C3:  No appreciable compromised to the thecal sac, exiting nerve roots are seen.  The neural desirae
royal are patent bilaterally.  No appreciable thecal sac stenosis is seen. 

 

C3-C4:  No appreciable compromised to the thecal sac, exiting nerve roots are seen.  The neural desirae
royal are patent bilaterally.  No appreciable thecal sac stenosis is seen.

 

C4-C5:  No appreciable compromised to the thecal sac, exiting nerve roots are seen.  The neural desirae
royal are patent bilaterally.  No appreciable thecal sac stenosis is seen.

 

C5-C6:  No appreciable compromised to the thecal sac, exiting nerve roots are seen.  The neural desirae
royal are patent bilaterally.  No appreciable thecal sac stenosis is seen.

 

C6-C7:  No appreciable compromised to the thecal sac, exiting nerve roots are seen.  The neural desirae
royal are patent bilaterally.  No appreciable thecal sac stenosis is seen.

 

C7-T1:  No appreciable compromised to the thecal sac, exiting nerve roots are seen.  The neural desirae
royal are patent bilaterally.  No appreciable thecal sac stenosis is seen

 

CONCLUSION:          Unremarkable study.

 

 

 

 MARCIO Cespedes MD on 2017 at 2:10           

Board Certified Radiologist.

 This report was verified electronically.

## 2017-07-12 ENCOUNTER — HOSPITAL ENCOUNTER (EMERGENCY)
Dept: HOSPITAL 17 - PHED | Age: 37
Discharge: HOME | End: 2017-07-12
Payer: MEDICAID

## 2017-07-12 VITALS
HEART RATE: 94 BPM | TEMPERATURE: 98.3 F | RESPIRATION RATE: 16 BRPM | SYSTOLIC BLOOD PRESSURE: 130 MMHG | OXYGEN SATURATION: 100 % | DIASTOLIC BLOOD PRESSURE: 81 MMHG

## 2017-07-12 VITALS — WEIGHT: 170.64 LBS | BODY MASS INDEX: 26.78 KG/M2 | HEIGHT: 67 IN

## 2017-07-12 VITALS
SYSTOLIC BLOOD PRESSURE: 100 MMHG | HEART RATE: 95 BPM | DIASTOLIC BLOOD PRESSURE: 74 MMHG | OXYGEN SATURATION: 98 % | RESPIRATION RATE: 16 BRPM

## 2017-07-12 VITALS
RESPIRATION RATE: 14 BRPM | DIASTOLIC BLOOD PRESSURE: 67 MMHG | HEART RATE: 73 BPM | OXYGEN SATURATION: 98 % | SYSTOLIC BLOOD PRESSURE: 110 MMHG

## 2017-07-12 VITALS
HEART RATE: 85 BPM | OXYGEN SATURATION: 98 % | DIASTOLIC BLOOD PRESSURE: 65 MMHG | RESPIRATION RATE: 16 BRPM | SYSTOLIC BLOOD PRESSURE: 100 MMHG

## 2017-07-12 DIAGNOSIS — R20.2: Primary | ICD-10-CM

## 2017-07-12 DIAGNOSIS — E11.9: ICD-10-CM

## 2017-07-12 DIAGNOSIS — Z79.84: ICD-10-CM

## 2017-07-12 DIAGNOSIS — R20.0: ICD-10-CM

## 2017-07-12 DIAGNOSIS — E04.1: ICD-10-CM

## 2017-07-12 DIAGNOSIS — F17.200: ICD-10-CM

## 2017-07-12 LAB
ALP SERPL-CCNC: 101 U/L (ref 45–117)
ALT SERPL-CCNC: 41 U/L (ref 10–53)
ANION GAP SERPL CALC-SCNC: 6 MEQ/L (ref 5–15)
AST SERPL-CCNC: 18 U/L (ref 15–37)
BASOPHILS # BLD AUTO: 0 TH/MM3 (ref 0–0.2)
BASOPHILS NFR BLD: 0.3 % (ref 0–2)
BILIRUB SERPL-MCNC: 0.6 MG/DL (ref 0.2–1)
BUN SERPL-MCNC: 15 MG/DL (ref 7–18)
CHLORIDE SERPL-SCNC: 105 MEQ/L (ref 98–107)
EOSINOPHIL # BLD: 0.2 TH/MM3 (ref 0–0.4)
EOSINOPHIL NFR BLD: 1.7 % (ref 0–4)
ERYTHROCYTE [DISTWIDTH] IN BLOOD BY AUTOMATED COUNT: 12.2 % (ref 11.6–17.2)
GFR SERPLBLD BASED ON 1.73 SQ M-ARVRAT: 63 ML/MIN (ref 89–?)
HCO3 BLD-SCNC: 28 MEQ/L (ref 21–32)
HCT VFR BLD CALC: 42.8 % (ref 35–46)
HEMO FLAGS: (no result)
LYMPHOCYTES # BLD AUTO: 2.7 TH/MM3 (ref 1–4.8)
LYMPHOCYTES NFR BLD AUTO: 21.8 % (ref 9–44)
MCH RBC QN AUTO: 31 PG (ref 27–34)
MCHC RBC AUTO-ENTMCNC: 33.9 % (ref 32–36)
MCV RBC AUTO: 91.4 FL (ref 80–100)
MONOCYTES NFR BLD: 5.5 % (ref 0–8)
NEUTROPHILS # BLD AUTO: 8.8 TH/MM3 (ref 1.8–7.7)
NEUTROPHILS NFR BLD AUTO: 70.7 % (ref 16–70)
PLATELET # BLD: 266 TH/MM3 (ref 150–450)
POTASSIUM SERPL-SCNC: 3.9 MEQ/L (ref 3.5–5.1)
RBC # BLD AUTO: 4.68 MIL/MM3 (ref 4–5.3)
SODIUM SERPL-SCNC: 139 MEQ/L (ref 136–145)
WBC # BLD AUTO: 12.4 TH/MM3 (ref 4–11)

## 2017-07-12 PROCEDURE — 70450 CT HEAD/BRAIN W/O DYE: CPT

## 2017-07-12 PROCEDURE — 72141 MRI NECK SPINE W/O DYE: CPT

## 2017-07-12 PROCEDURE — 99285 EMERGENCY DEPT VISIT HI MDM: CPT

## 2017-07-12 PROCEDURE — 84703 CHORIONIC GONADOTROPIN ASSAY: CPT

## 2017-07-12 PROCEDURE — 84484 ASSAY OF TROPONIN QUANT: CPT

## 2017-07-12 PROCEDURE — 84443 ASSAY THYROID STIM HORMONE: CPT

## 2017-07-12 PROCEDURE — 96374 THER/PROPH/DIAG INJ IV PUSH: CPT

## 2017-07-12 PROCEDURE — 80053 COMPREHEN METABOLIC PANEL: CPT

## 2017-07-12 PROCEDURE — 96375 TX/PRO/DX INJ NEW DRUG ADDON: CPT

## 2017-07-12 PROCEDURE — 85025 COMPLETE CBC W/AUTO DIFF WBC: CPT

## 2017-07-12 PROCEDURE — 93005 ELECTROCARDIOGRAM TRACING: CPT

## 2017-07-12 NOTE — PD
HPI


Chief Complaint:  Numbness/Tingling


Time Seen by Provider:  10:45


Travel History


International Travel<30 days:  No


Contact w/Intl Traveler<30days:  No


Traveled to known affect area:  No





History of Present Illness


HPI


PATIENT C/O NUMBNESS TO ENTIRE LUE FROM SHOULDER TO TIP OF ALL FINGERS, DENIES 

ANY TRAUMA.  NONRADIATING, PATIENT STATED SHE SAW HER PCP AND NO WORKUP DONE, 

PT CONTINUES TO HAVE NUMBNESS AND TINGLING TO ENTIRE LUE.  DENIES CP/SOB/COUGH/

URI SX/





PFSH


Past Medical History


Hx Anticoagulant Therapy:  No


Anxiety:  Yes


Diabetes:  Yes


Patient Takes Glucophage:  No


Diminished Hearing:  No


Immunizations Current:  No


Tetanus Vaccination:  Unknown


Pregnant?:  Not Pregnant





Past Surgical History


 Section:  Yes





Social History


Alcohol Use:  Yes (Rarely)


Tobacco Use:  Yes (/ PPD)


Substance Use:  No





Allergies-Medications


(Allergen,Severity, Reaction):  


Coded Allergies:  


     Latex (Verified  Allergy, Severe, 17)


Reported Meds & Prescriptions





Reported Meds & Active Scripts


Active


Flexeril (Cyclobenzaprine HCl) 10 Mg Tab 10 Mg PO TID


Codeine-Acetaminophen  mg Tab 1 Tab PO Q4H PRN


Klonopin (Clonazepam) 0.5 Mg Tab 0.5 Mg PO HS


Citalopram (Citalopram Hydrobromide) 20 Mg Tab 20 Mg PO DAILY


Januvia (Sitagliptin Phosphate) 100 Mg Tab 100 Mg PO DAILY








Review of Systems


Except as stated in HPI:  all other systems reviewed are Neg


Neurologic:  Positive: Paresthesia, Sensory Disturbance





Physical Exam


Narrative


GENERAL: 


SKIN: Warm and dry.


HEAD: Atraumatic. Normocephalic. 


EYES: Pupils equal and round. No scleral icterus. No injection or drainage. 


ENT: No nasal bleeding or discharge.  Mucous membranes pink and moist.


NECK: Trachea midline. No JVD. 


CARDIOVASCULAR: Regular rate and rhythm.  


RESPIRATORY: No accessory muscle use. Clear to auscultation. Breath sounds 

equal bilaterally. 


GASTROINTESTINAL: Abdomen soft, non-tender, nondistended. 


MUSCULOSKELETAL: Extremities without clubbing, cyanosis, or edema. No obvious 

deformities. 


NEUROLOGICAL: Awake and alert. No obvious cranial nerve deficits.  Motor 

grossly within normal limits. Five out of 5 muscle strength in the arms and 

legs.  Normal speech.  HOWEVER, DECREASED SENSORY TO ENTIRE LUE, NO PARTICULAR 

DERMATOME


PSYCHIATRIC: Appropriate mood and affect; insight and judgment normal.





Data


Data


Last Documented VS





Vital Signs








  Date Time  Temp Pulse Resp B/P Pulse Ox O2 Delivery O2 Flow Rate FiO2


 


17 15:45  73 14 110/67 98 Room Air  


 


17 10:38 98.3       








Orders





 Complete Blood Count With Diff (17 11:07)


Comprehensive Metabolic Panel (17 11:07)


Troponin I (17 11:07)


Thyroid Stimulating Hormone (17 11:07)


Ct Brain W/O Iv Contrast(Rout) (17 )


Ed Urine Pregnancytest Poc (17 11:07)


Electrocardiogram (17 11:07)


Hydromorphone Pf Inj (Dilaudid Pf Inj) (17 11:30)


Ondansetron Inj (Zofran Inj) (17 11:30)


Mri C Spine W/O Contrast (17 )





Labs





 Laboratory Tests








Test 17





 11:20


 


White Blood Count 12.4 TH/MM3


 


Red Blood Count 4.68 MIL/MM3


 


Hemoglobin 14.5 GM/DL


 


Hematocrit 42.8 %


 


Mean Corpuscular Volume 91.4 FL


 


Mean Corpuscular Hemoglobin 31.0 PG


 


Mean Corpuscular Hemoglobin 33.9 %





Concent 


 


Red Cell Distribution Width 12.2 %


 


Platelet Count 266 TH/MM3


 


Mean Platelet Volume 7.8 FL


 


Neutrophils (%) (Auto) 70.7 %


 


Lymphocytes (%) (Auto) 21.8 %


 


Monocytes (%) (Auto) 5.5 %


 


Eosinophils (%) (Auto) 1.7 %


 


Basophils (%) (Auto) 0.3 %


 


Neutrophils # (Auto) 8.8 TH/MM3


 


Lymphocytes # (Auto) 2.7 TH/MM3


 


Monocytes # (Auto) 0.7 TH/MM3


 


Eosinophils # (Auto) 0.2 TH/MM3


 


Basophils # (Auto) 0.0 TH/MM3


 


CBC Comment DIFF FINAL 


 


Differential Comment  


 


Sodium Level 139 MEQ/L


 


Potassium Level 3.9 MEQ/L


 


Chloride Level 105 MEQ/L


 


Carbon Dioxide Level 28.0 MEQ/L


 


Anion Gap 6 MEQ/L


 


Blood Urea Nitrogen 15 MG/DL


 


Creatinine 1.00 MG/DL


 


Estimat Glomerular Filtration 63 ML/MIN





Rate 


 


Random Glucose 125 MG/DL


 


Calcium Level 9.1 MG/DL


 


Total Bilirubin 0.6 MG/DL


 


Aspartate Amino Transf 18 U/L





(AST/SGOT) 


 


Alanine Aminotransferase 41 U/L





(ALT/SGPT) 


 


Alkaline Phosphatase 101 U/L


 


Troponin I LESS THAN 0.02





 NG/ML


 


Total Protein 7.6 GM/DL


 


Albumin 4.1 GM/DL


 


Thyroid Stimulating Hormone 1.220 uIU/ML





3rd Gen 











[facility]


Medical Decision Making


Medical Screen Exam Complete:  Yes


Emergency Medical Condition:  Yes


Medical Record Reviewed:  Yes


Interpretation(s)


NSR 83, NL INTERVALS, NO STEMI PATTERN


Differential Diagnosis


ATYPICAL STEMI V RADICULOPATHY V PARESTHESIA V NEUROPATHY


Narrative Course


EKG NEG FOR STEMI, NO ICH ON CT HEAD...NORMAL  THYROID SCREENING, MRI PERFORMED

  NO OVERT CORD COMPRESSION FOUND HOWEVER NOTED INCIDENTAL THYROID NODULE THAT 

I MADE PATIENT AWARE OF FOR FURTHER FOLLOWUP AFTER COMPLETING EVALUATION WITH 

NEUROLOGIST.





Physician Communication


Physician Communication


D/W DR VASQUEZ RADIOLOGIST, [facility] MRI OF CERVICAL SPINE FOR BEST STUDY





Diagnosis





 Primary Impression:  


 PROBABLE LEFT UPPER EXTREMITY  RADICULOPATHY


Referrals:  


Andres Cotton PhD MD


FOR FURTHER EVALUATION


Patient Instructions:  Cervical Radiculopathy (ED), General Instructions


Scripts


Cyclobenzaprine (Flexeril)10 Mg Tab10 Mg PO TID  #21 TAB


   Prov:Hemant Burns MD         17 


Codeine-Acetaminophen  mg Tab1 Tab PO Q4H PRN (PAIN) #20 TAB


   Prov:Hemant Burns MD         17


Disposition:  01 DISCHARGE HOME


Condition:  Stable








Hemant Burns MD 2017 11:29

## 2017-07-12 NOTE — RADRPT
EXAM DATE/TIME:  07/12/2017 11:48 

 

[facility] COMPARISON:     

CT CERVICAL SPINE W/O CONTRAST, June 04, 2017, 1:55.

 

 

INDICATIONS :     

Parasthesia for 5 days.

                      

 

RADIATION DOSE:     

63.43 CTDIvol (mGy) 

 

 

 

MEDICAL HISTORY :     

None  

 

SURGICAL HISTORY :      

None. 

 

ENCOUNTER:      

Initial

 

ACUITY:      

4 - 6 days

 

PAIN SCALE:      

3/10

 

LOCATION:       

Left  arm

 

TECHNIQUE:     

Multiple contiguous axial images were obtained of the head.  Using automated exposure control and adj
ustment of the mA and/or kV according to patient size, radiation dose was kept as low as reasonably a
chievable to obtain optimal diagnostic quality images.   DICOM format image data is available electro
nically for review and comparison.  

 

FINDINGS:     

 

CEREBRUM:     

The ventricles are normal for age.  No evidence of midline shift, mass lesion, hemorrhage or acute in
farction.  No extra-axial fluid collections are seen.

 

POSTERIOR FOSSA:     

The cerebellum and brainstem are intact.  The 4th ventricle is midline.  The cerebellopontine angle i
s unremarkable.

 

EXTRACRANIAL:     

The visualized portion of the orbits is intact.

 

SKULL:     

The calvaria is intact.  No evidence of skull fracture.

 

CONCLUSION:     

1. Negative examination.

 

 

 

 Gustavo Morales MD on July 12, 2017 at 12:28           

Board Certified Radiologist.

 This report was verified electronically.

## 2017-07-12 NOTE — RADRPT
EXAM DATE/TIME:  2017 13:06 

 

[facility] COMPARISON:     

CT CERVICAL SPINE W/O CONTRAST, 2017, 1:55.

       

 

 

INDICATIONS :     

Left arm numbness.

                     

 

MEDICAL HISTORY :     

Diabetes mellitus type 2.     

 

SURGICAL HISTORY :     

 section.     

 

ENCOUNTER:     

Initial

 

ACUITY:     

2 weeks

 

PAIN SCORE:     

0/10

 

LOCATION:         

neck

 

TECHNIQUE:     

Multiplanar, multisequence MRI examination of the cervical spine was performed.

 

FINDINGS:     

 

VERTEBRAE:     

Normal vertebral body height.  Homogeneous marrow signal.

 

ALIGNMENT:     

There is straightening of the cervical spine with a gentle kyphosis.

 

CORD:     

Normal configuration and signal.

 

POST FOSSA:     

The cerebellar tonsils are normal in position.

 

A 1.1 cm left lower pole thyroid nodule is noted.

 

C2-C3:  

The thecal sac has a normal configuration.  There is no evidence of disc herniation or spinal canal s
tenosis.  The neural foramina are patent bilaterally.

 

C3-C4: 

There is a mild broad-based disc bulge. No flattening of the cord or central canal stenosis. Lateral 
recesses and neural foramina are patent.

 

C4-C5:  

There is a mild broad-based disc bulge. No flattening of the cord or central canal stenosis. Lateral 
recesses and neural foramina are patent.

 

C5-C6: 

A broad-based disc bulge mildly flattens the ventral portion of the cord. Anterior to posterior dimen
milind of the central canal in the midline is 8 mm. There is narrowing of the lateral recesses bilatera
lly. Neural foramina are patent bilaterally.

 

C6-C7:  

There is a mild broad-based disc bulge. No flattening of the cord or central canal stenosis. Lateral 
recesses and neural foramina are patent.

 

C7-T1:  

The thecal sac has a normal configuration.  There is no evidence of disc herniation or spinal canal s
tenosis.  The neural foramina are patent bilaterally.

 

CONCLUSION:     

1. Multilevel degenerative changes with areas of abutment of the cord but no signal change within the
 cord to suggest edema or myelomalacia. Patent neural foramina throughout.

2. 11 mm left thyroid nodule.

 

 

 

 Luis Felipe Lauren Jr., MD on 2017 at 13:48           

Board Certified Radiologist.

 This report was verified electronically.

## 2017-10-23 ENCOUNTER — HOSPITAL ENCOUNTER (EMERGENCY)
Dept: HOSPITAL 17 - NEPD | Age: 37
Discharge: HOME | End: 2017-10-23
Payer: MEDICAID

## 2017-10-23 VITALS
TEMPERATURE: 98.4 F | RESPIRATION RATE: 14 BRPM | OXYGEN SATURATION: 99 % | HEART RATE: 87 BPM | SYSTOLIC BLOOD PRESSURE: 181 MMHG | DIASTOLIC BLOOD PRESSURE: 109 MMHG

## 2017-10-23 VITALS — HEIGHT: 67 IN | BODY MASS INDEX: 27.68 KG/M2 | WEIGHT: 176.37 LBS

## 2017-10-23 VITALS — DIASTOLIC BLOOD PRESSURE: 68 MMHG | SYSTOLIC BLOOD PRESSURE: 148 MMHG

## 2017-10-23 DIAGNOSIS — Z72.0: ICD-10-CM

## 2017-10-23 DIAGNOSIS — E11.9: ICD-10-CM

## 2017-10-23 DIAGNOSIS — K08.89: Primary | ICD-10-CM

## 2017-10-23 PROCEDURE — 99284 EMERGENCY DEPT VISIT MOD MDM: CPT

## 2017-10-23 NOTE — PD
HPI


Chief Complaint:  Oral / Dental Pain or Problem


Time Seen by Provider:  09:13


Travel History


International Travel<30 days:  No


Contact w/Intl Traveler<30days:  No


Traveled to known affect area:  No





History of Present Illness


HPI


36-year-old female presents emergency Department with complaint of left lower 

dental pain that started last night.  Denies fever, vomiting.  Denies 

difficulty swallowing, sore throat, unusual drooling.  Pain radiates to her 

left ear.  Has not taken any medication or turning treatments to alleviate her 

symptoms.  Describes pain as stabbing, throbbing, aching, is shooting.  Pain is 

10/10.  No known relieving factors.  Pain is constant.  Allergies to latex.  

Has no other medical complaints.  No other modifying factors or associated 

signs and symptoms.





PFSH


Past Medical History


Hx Anticoagulant Therapy:  No


Anxiety:  Yes


Diabetes:  Yes


Patient Takes Glucophage:  No


Diminished Hearing:  No


Immunizations Current:  No


Tetanus Vaccination:  Unknown


Pregnant?:  Not Pregnant





Past Surgical History


 Section:  Yes





Social History


Alcohol Use:  Yes (Rarely)


Tobacco Use:  Yes (/ PPD)


Substance Use:  No





Allergies-Medications


(Allergen,Severity, Reaction):  


Coded Allergies:  


     latex (Unverified  Allergy, Severe, 10/23/17)


Reported Meds & Prescriptions





Reported Meds & Active Scripts


Active


Amoxicillin 500 Mg Cap 500 Mg PO BID 10 Days


Peridex Liq (Chlorhexidine Gluconate (Mouth) Liq) 0.12% Soln 15 Ml SWISH-SPIT 

BID 10 Days


Ibuprofen 800 Mg Tab 800 Mg PO Q6HR PRN


Klonopin (Clonazepam) 0.5 Mg Tab 0.5 Mg PO HS


Citalopram (Citalopram Hydrobromide) 20 Mg Tab 20 Mg PO DAILY


Januvia (Sitagliptin Phosphate) 100 Mg Tab 100 Mg PO DAILY








Review of Systems


Except as stated in HPI:  all other systems reviewed are Neg





Physical Exam


Narrative


GENERAL: Well-nourished, well-developed  female patient, in no acute 

distress; afebrile, nontoxic-appearing


SKIN: Warm and dry.


HEAD: Atraumatic. Normocephalic.  No facial edema, erythema, tenderness on 

palpation.  No lymphadenopathy.  


EYES: Pupils equal and round. No scleral icterus. No injection or drainage.


ENT: Mucosa pink and moist. No erythema or exudates. No uvular edema. No uvular

, palatal, or tonsillar deviation.  


Airway patent. 


EARS: Bilateral pinnae and external canals appear within normal limits. 

Bilateral tympanic membranes without  


erythema, dullness or perforation.


MOUTH: Mucous membranes moist, no lesions, tongue and gums appear normal.  

Tooth #17 with tenderness on palpation.  Surrounding gingiva is with edema; 

without erythema, drainage.  No obvious abscess noted.  


NECK: Trachea midline.  No lymphadenopathy.  


CARDIOVASCULAR: Regular rate.  


RESPIRATORY: No accessory muscle use.


GASTROINTESTINAL: Round.


MUSCULOSKELETAL: No obvious deformities. No clubbing.  No cyanosis.  No edema. 


NEUROLOGICAL: Awake and alert.  Oriented 3.  No obvious cranial nerve 

deficits.  Motor grossly within normal limits. Normal speech. 


PSYCHIATRIC: Appropriate mood and affect; insight and judgment normal.





Data


Data


Last Documented VS





Vital Signs








  Date Time  Temp Pulse Resp B/P (MAP) Pulse Ox O2 Delivery O2 Flow Rate FiO2


 


10/23/17 08:28 98.4 87 14 181/109 (133) [phone]   








Orders





 Orders


Ed Discharge Order (10/23/17 09:18)


Ibuprofen (Motrin) (10/23/17 09:30)








MDM


Medical Decision Making


Medical Screen Exam Complete:  Yes


Emergency Medical Condition:  Yes


Medical Record Reviewed:  Yes


Differential Diagnosis


Dentalgia, dental abscess, impacted dentition, gingivitis


Narrative Course


36-year-old female with dentalgia tooth #17.  No obvious abscess noted.  No 

facial edema, erythema.  Patient is afebrile and nontoxic-appearing.  Denies 

fever, vomiting.  Emergency dental information sheet provided for follow-up.  

Ibuprofen administered in the ER.  Amoxicillin, Peridex mouth rinse, ibuprofen 

prescribed for home.  Instructed patient to follow up with primary care 

provider.  Patient verbalizes understanding and agreement with treatment plan.  

Patient is medically cleared and stable for discharge.  Discussed reasons to 

return to the emergency department.  Patient agrees with treatment plan.  The 

patients vital signs are stable and the patient is stable for outpatient follow-

up and treatment.  Patient discharged home, stable and in no acute distress.





Diagnosis





 Primary Impression:  


 Dentalgia


Referrals:  


Dentist





Primary Care Physician


Patient Instructions:  Dental Abscess (ED), Dental Caries (ED), General 

Instructions, Toothache (ED)


Departure Forms:  Tests/Procedures, Work Release   Enter return to work date:  

Oct 23, 2017





***Additional Instructions:  


Complete full course of antibiotics


Ibuprofen or Tylenol as directed and as needed to reduce pain and inflammation


Use Peridex as directed for oral hygiene


Warm or cool compresses to the affected area


Follow-up with dentist


Follow-up with primary care provider


Return to emergency department immediately with worsening of symptoms


***Med/Other Pt SpecificInfo:  Prescription(s) given


Scripts


Amoxicillin (Amoxicillin) 500 Mg Cap


500 MG PO BID for Infection for 10 Days, #20 CAP 0 Refills


   Prov: Yvonne Pedersen         10/23/17 


Chlorhexidine Gluconate (Mouth) Liq (Peridex Liq) 0.12% Soln


15 ML SWISH-SPIT BID for 10 Days, #300 ML 0 Refills


   Prov: Yvonne Pedersen         10/23/17 


Ibuprofen (Ibuprofen) 800 Mg Tab


800 MG PO Q6HR Y for PAIN, #30 TAB 0 Refills


   Prov: Yvonne Pedersen         10/23/17


Disposition:  01 DISCHARGE HOME


Condition:  Stable











Yvonne Pedersen Oct 23, 2017 09:18

## 2017-11-20 ENCOUNTER — HOSPITAL ENCOUNTER (EMERGENCY)
Dept: HOSPITAL 17 - NEPD | Age: 37
Discharge: HOME | End: 2017-11-20
Payer: COMMERCIAL

## 2017-11-20 VITALS — DIASTOLIC BLOOD PRESSURE: 76 MMHG | SYSTOLIC BLOOD PRESSURE: 117 MMHG

## 2017-11-20 VITALS
OXYGEN SATURATION: 97 % | DIASTOLIC BLOOD PRESSURE: 79 MMHG | HEART RATE: 101 BPM | SYSTOLIC BLOOD PRESSURE: 122 MMHG | TEMPERATURE: 98.6 F | RESPIRATION RATE: 19 BRPM

## 2017-11-20 VITALS
DIASTOLIC BLOOD PRESSURE: 79 MMHG | TEMPERATURE: 98.6 F | SYSTOLIC BLOOD PRESSURE: 122 MMHG | HEART RATE: 101 BPM | OXYGEN SATURATION: 97 % | RESPIRATION RATE: 19 BRPM

## 2017-11-20 VITALS — BODY MASS INDEX: 26.64 KG/M2 | HEIGHT: 67 IN | WEIGHT: 169.76 LBS

## 2017-11-20 DIAGNOSIS — E11.9: ICD-10-CM

## 2017-11-20 DIAGNOSIS — M51.36: ICD-10-CM

## 2017-11-20 DIAGNOSIS — S16.1XXA: Primary | ICD-10-CM
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
## 2017-11-20 DIAGNOSIS — F17.200: ICD-10-CM

## 2017-11-20 DIAGNOSIS — M62.830: ICD-10-CM

## 2017-11-20 DIAGNOSIS — F41.9: ICD-10-CM

## 2017-11-20 PROCEDURE — 96374 THER/PROPH/DIAG INJ IV PUSH: CPT

## 2017-11-20 PROCEDURE — 72100 X-RAY EXAM L-S SPINE 2/3 VWS: CPT

## 2017-11-20 PROCEDURE — 71010: CPT

## 2017-11-20 PROCEDURE — 99285 EMERGENCY DEPT VISIT HI MDM: CPT

## 2017-11-20 PROCEDURE — 72125 CT NECK SPINE W/O DYE: CPT

## 2017-11-20 PROCEDURE — 96375 TX/PRO/DX INJ NEW DRUG ADDON: CPT
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
## 2018-02-22 ENCOUNTER — HOSPITAL ENCOUNTER (EMERGENCY)
Dept: HOSPITAL 17 - NEPC | Age: 38
LOS: 1 days | Discharge: HOME | End: 2018-02-23
Payer: COMMERCIAL

## 2018-02-22 VITALS — WEIGHT: 171.96 LBS | BODY MASS INDEX: 26.99 KG/M2 | HEIGHT: 67 IN

## 2018-02-22 VITALS
TEMPERATURE: 98.4 F | OXYGEN SATURATION: 98 % | DIASTOLIC BLOOD PRESSURE: 78 MMHG | SYSTOLIC BLOOD PRESSURE: 136 MMHG | RESPIRATION RATE: 16 BRPM | HEART RATE: 80 BPM

## 2018-02-22 DIAGNOSIS — M54.6: ICD-10-CM

## 2018-02-22 DIAGNOSIS — F41.9: ICD-10-CM
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
## 2018-02-22 DIAGNOSIS — R82.71: ICD-10-CM

## 2018-02-22 DIAGNOSIS — Z79.84: ICD-10-CM

## 2018-02-22 DIAGNOSIS — F17.200: ICD-10-CM

## 2018-02-22 DIAGNOSIS — E11.9: ICD-10-CM

## 2018-02-22 DIAGNOSIS — R51: ICD-10-CM

## 2018-02-22 DIAGNOSIS — S39.92XA: Primary | ICD-10-CM

## 2018-02-22 DIAGNOSIS — M54.2: ICD-10-CM

## 2018-02-22 LAB
BASOPHILS # BLD AUTO: 0.1 TH/MM3 (ref 0–0.2)
BASOPHILS NFR BLD: 0.6 % (ref 0–2)
BUN SERPL-MCNC: 18 MG/DL (ref 7–18)
CALCIUM SERPL-MCNC: 9.7 MG/DL (ref 8.5–10.1)
CHLORIDE SERPL-SCNC: 101 MEQ/L (ref 98–107)
CREAT SERPL-MCNC: 1.06 MG/DL (ref 0.5–1)
EOSINOPHIL # BLD: 0.1 TH/MM3 (ref 0–0.4)
EOSINOPHIL NFR BLD: 0.9 % (ref 0–4)
ERYTHROCYTE [DISTWIDTH] IN BLOOD BY AUTOMATED COUNT: 12.6 % (ref 11.6–17.2)
GFR SERPLBLD BASED ON 1.73 SQ M-ARVRAT: 58 ML/MIN (ref 89–?)
GLUCOSE SERPL-MCNC: 253 MG/DL (ref 74–106)
HCO3 BLD-SCNC: 27.2 MEQ/L (ref 21–32)
HCT VFR BLD CALC: 42.6 % (ref 35–46)
HGB BLD-MCNC: 14.5 GM/DL (ref 11.6–15.3)
LYMPHOCYTES # BLD AUTO: 2.9 TH/MM3 (ref 1–4.8)
LYMPHOCYTES NFR BLD AUTO: 21.8 % (ref 9–44)
MCH RBC QN AUTO: 32.3 PG (ref 27–34)
MCHC RBC AUTO-ENTMCNC: 34 % (ref 32–36)
MCV RBC AUTO: 94.8 FL (ref 80–100)
MONOCYTE #: 0.9 TH/MM3 (ref 0–0.9)
MONOCYTES NFR BLD: 7.2 % (ref 0–8)
NEUTROPHILS # BLD AUTO: 9.1 TH/MM3 (ref 1.8–7.7)
NEUTROPHILS NFR BLD AUTO: 69.5 % (ref 16–70)
PLATELET # BLD: 243 TH/MM3 (ref 150–450)
PMV BLD AUTO: 8.3 FL (ref 7–11)
RBC # BLD AUTO: 4.49 MIL/MM3 (ref 4–5.3)
SODIUM SERPL-SCNC: 135 MEQ/L (ref 136–145)
WBC # BLD AUTO: 13.1 TH/MM3 (ref 4–11)

## 2018-02-22 PROCEDURE — 96361 HYDRATE IV INFUSION ADD-ON: CPT

## 2018-02-22 PROCEDURE — 74177 CT ABD & PELVIS W/CONTRAST: CPT

## 2018-02-22 PROCEDURE — 84702 CHORIONIC GONADOTROPIN TEST: CPT

## 2018-02-22 PROCEDURE — 70450 CT HEAD/BRAIN W/O DYE: CPT

## 2018-02-22 PROCEDURE — 72132 CT LUMBAR SPINE W/DYE: CPT

## 2018-02-22 PROCEDURE — 81001 URINALYSIS AUTO W/SCOPE: CPT

## 2018-02-22 PROCEDURE — 72129 CT CHEST SPINE W/DYE: CPT

## 2018-02-22 PROCEDURE — 72125 CT NECK SPINE W/O DYE: CPT

## 2018-02-22 PROCEDURE — 99284 EMERGENCY DEPT VISIT MOD MDM: CPT

## 2018-02-22 PROCEDURE — 96374 THER/PROPH/DIAG INJ IV PUSH: CPT

## 2018-02-22 PROCEDURE — 96375 TX/PRO/DX INJ NEW DRUG ADDON: CPT

## 2018-02-22 PROCEDURE — 84703 CHORIONIC GONADOTROPIN ASSAY: CPT

## 2018-02-22 PROCEDURE — 87186 SC STD MICRODIL/AGAR DIL: CPT

## 2018-02-22 PROCEDURE — 80307 DRUG TEST PRSMV CHEM ANLYZR: CPT

## 2018-02-22 PROCEDURE — 71260 CT THORAX DX C+: CPT

## 2018-02-22 PROCEDURE — 87086 URINE CULTURE/COLONY COUNT: CPT

## 2018-02-22 PROCEDURE — 87077 CULTURE AEROBIC IDENTIFY: CPT

## 2018-02-22 PROCEDURE — 85025 COMPLETE CBC W/AUTO DIFF WBC: CPT

## 2018-02-22 PROCEDURE — 80048 BASIC METABOLIC PNL TOTAL CA: CPT

## 2018-02-22 NOTE — RADRPT
EXAM DATE/TIME:  2018 21:42 

 

[facility] COMPARISON:     

CT CERVICAL SPINE W/O CONTRAST, 2017, 12:56.

 

 

INDICATIONS :     

Neck pain due to fall.

                      

 

RADIATION DOSE:     

34.62 CTDIvol (mGy) 

 

 

 

MEDICAL HISTORY :     

Diabetes mellitus type 2.  

 

SURGICAL HISTORY :      

 section. 

 

ENCOUNTER:      

Initial

 

ACUITY:      

1 day

 

PAIN SCALE:      

9/10

 

LOCATION:       

Bilateral  neck region.

 

TECHNIQUE:     

Volumetric scanning of the cervical spine was performed. Multiplanar reconstructions in the sagittal,
 coronal and oblique axial planes were performed.   Using automated exposure control and adjustment o
f the mA and/or kV according to patient size, radiation dose was kept as low as reasonably achievable
 to obtain optimal diagnostic quality images.   DICOM format image data is available electronically f
or review and comparison.  

 

FINDINGS:     

Vertebral body heights are maintained. Osseous structures are intact without evidence for acute bony 
fracture. Dens is intact. Sagittal alignment is maintained. There is a normal C1-2 relationship. Face
ts are normally aligned. There is no significant prevertebral soft tissue hematoma. No significant ce
rvical adenopathy or gross mass. Visualized lung apices are clear without pneumothorax.

 

 

CONCLUSION:     

1. No acute fracture or subluxation.

 

 

 

 Gerson Hussein MD on 2018 at 22:12           

Board Certified Radiologist.

 This report was verified electronically.

## 2018-02-22 NOTE — RADRPT
EXAM DATE/TIME:  2018 21:42 

 

[facility] COMPARISON:     

CT BRAIN W/O CONTRAST, 2017, 11:48.

 

 

INDICATIONS :     

Head pain due to fall.

                      

 

RADIATION DOSE:     

56.35 CTDIvol (mGy) 

 

 

 

MEDICAL HISTORY :     

Diabetes mellitus type 2.  

 

SURGICAL HISTORY :      

 section. 

 

ENCOUNTER:      

Initial

 

ACUITY:      

1 day

 

PAIN SCALE:      

9/10

 

LOCATION:       

Bilateral cranial 

 

TECHNIQUE:     

Multiple contiguous axial images were obtained of the head.  Using automated exposure control and adj
ustment of the mA and/or kV according to patient size, radiation dose was kept as low as reasonably a
chievable to obtain optimal diagnostic quality images.   DICOM format image data is available electro
nically for review and comparison.  

 

FINDINGS:     

 

CEREBRUM:     

The ventricles are normal for age.  No evidence of midline shift, mass lesion, hemorrhage or acute in
farction.  No extra-axial fluid collections are seen.

 

POSTERIOR FOSSA:     

The cerebellum and brainstem are intact.  The 4th ventricle is midline.  The cerebellopontine angle i
s unremarkable.

 

EXTRACRANIAL:     

The visualized portion of the orbits is intact.

 

SKULL:     

The calvaria is intact.  No evidence of skull fracture.

 

CONCLUSION:     

1. No acute intracranial abnormality.

 

 

 

 Gerson Hussein MD on 2018 at 21:59           

Board Certified Radiologist.

 This report was verified electronically.

## 2018-02-22 NOTE — PD
HPI


Chief Complaint:  fall back pain


Time Seen by Provider:  20:41


Travel History


International Travel<30 days:  No


Contact w/Intl Traveler<30days:  No


Traveled to known affect area:  No





History of Present Illness


HPI


Patient is a 37-year-old female who was coming down from a ladder and on the 

left third and fourth rung she fell backwards landing on her back and her head 

and her neck.  She arrives by paramedics normal vital signs awake alert 

oriented 3 but complaining of head pain cervical pain thoracic spine pain and 

lumbar spine pain she also says it is difficult to lift her right leg because 

of the pain.  She is logrolled off the backboard C-spine precaution and 

continues to have the same pains even off of the backboard denies the ability 

to be pregnant she has an IUD and no other complaints at this time no obvious 

lacerations she is alert oriented 3 she happened just prior to arrival and she 

did not take any medication to alleviate the symptoms.  And she has not seen 

another doctor for this complaint





PFSH


Past Medical History


Hx Anticoagulant Therapy:  No


Anxiety:  Yes


Diabetes:  Yes


Diminished Hearing:  No


Immunizations Current:  No





Past Surgical History


 Section:  Yes





Social History


Alcohol Use:  Yes (Rarely)


Tobacco Use:  Yes ( PPD)


Substance Use:  No





Allergies-Medications


(Allergen,Severity, Reaction):  


Coded Allergies:  


     latex (Unverified  Allergy, Severe, 18)


Reported Meds & Prescriptions





Reported Meds & Active Scripts


Active


Valium (Diazepam) 5 Mg Tab 5 Mg PO TID PRN


Ibuprofen 600 Mg Tab 600 Mg PO Q6H PRN


Klonopin (Clonazepam) 0.5 Mg Tab 0.5 Mg PO BID


Januvia (Sitagliptin Phosphate) 100 Mg Tab 100 Mg PO DAILY


Reported


Norco (Hydrocodone-Acetaminophen)  Mg Tab 1 Tab PO DAILY PRN








Review of Systems


Except as stated in HPI:  all other systems reviewed are Neg


Musculoskeletal:  Positive: Myalgias, Arthralgias (Back pain thoracic and 

lumbar from fall)





Physical Exam


Narrative


GENERAL: C-collar backboard


SKIN: Warm and dry.


HEAD: Atraumatic. Normocephalic. 


EYES: Pupils equal and round. No scleral icterus. No injection or drainage.  

Pupils equal reactive, full range of motion of extraocular motion


ENT: No nasal bleeding or discharge.  Mucous membranes pink and moist.


NECK: Trachea midline. No JVD. 


CARDIOVASCULAR: Regular rate and rhythm.  


RESPIRATORY: No accessory muscle use. Clear to auscultation. Breath sounds 

equal bilaterally. 


GASTROINTESTINAL: Abdomen soft, non-tender, nondistended. Hepatic and splenic 

margins not palpable. 


 spinous process thoracic all the way through the lumbar no step-off 

felt no hematoma seen


MUSCULOSKELETAL: Extremities without clubbing, cyanosis, or edema. No obvious 

deformities. 


NEUROLOGICAL: Awake and alert. No obvious cranial nerve deficits.  Motor 

grossly within normal limits. Five out of 5 muscle strength in the arms and 

legs.  Normal speech.


PSYCHIATRIC: Appropriate mood and affect; insight and judgment normal.





Data


Data


Last Documented VS





Vital Signs








  Date Time  Temp Pulse Resp B/P (MAP) Pulse Ox O2 Delivery O2 Flow Rate FiO2


 


18 00:08 98.1 73 18 122/76 (91) 98   








Orders





 Orders


Basic Metabolic Panel (Bmp) (18 20:45)


Complete Blood Count With Diff (18 20:45)


Alcohol (Ethanol) (18 20:45)


Urinalysis - C+S If Indicated (18 20:45)


Ct Brain W/O Iv Contrast(Rout) (18 20:45)


Ct Cerv Spine W/O Contrast (18 20:45)


Ct Abd/Pel W Iv Contrast(Rout) (18 20:45)


Ct Thorax/ Chest W Iv Contrast (18 20:45)


Ct Thor Spine W Iv Contrast (18 20:45)


Ct Lumb Spine W Iv Contrast (18 20:45)


Iv Access Insert/Monitor (18 20:45)


Ecg Monitoring (18 20:45)


Oximetry (18 20:45)


Oxygen Administration (18 20:45)


Sodium Chloride 0.9% Flush (Ns Flush) (18 20:45)


Ed Urine Pregnancytest Poc (18 20:45)


Beta Hcg (Quant/Titer) (18 20:50)


Morphine Inj (Morphine Inj) (18 21:45)


Ondansetron Inj (Zofran Inj) (18 21:45)


Iohexol 350 Inj (Omnipaque 350 Inj) (18 20:30)


Ketorolac Inj (Toradol Inj) (18 23:00)


Sodium Chlor 0.9% 1000 Ml Inj (Ns 1000 M (18 23:15)


Ed Discharge Order (18 23:57)


Urine Culture (18 23:49)





Labs





Laboratory Tests








Test


  18


20:50 18


23:49


 


White Blood Count 13.1 TH/MM3  


 


Red Blood Count 4.49 MIL/MM3  


 


Hemoglobin 14.5 GM/DL  


 


Hematocrit 42.6 %  


 


Mean Corpuscular Volume 94.8 FL  


 


Mean Corpuscular Hemoglobin 32.3 PG  


 


Mean Corpuscular Hemoglobin


Concent 34.0 % 


  


 


 


Red Cell Distribution Width 12.6 %  


 


Platelet Count 243 TH/MM3  


 


Mean Platelet Volume 8.3 FL  


 


Neutrophils (%) (Auto) 69.5 %  


 


Lymphocytes (%) (Auto) 21.8 %  


 


Monocytes (%) (Auto) 7.2 %  


 


Eosinophils (%) (Auto) 0.9 %  


 


Basophils (%) (Auto) 0.6 %  


 


Neutrophils # (Auto) 9.1 TH/MM3  


 


Lymphocytes # (Auto) 2.9 TH/MM3  


 


Monocytes # (Auto) 0.9 TH/MM3  


 


Eosinophils # (Auto) 0.1 TH/MM3  


 


Basophils # (Auto) 0.1 TH/MM3  


 


CBC Comment DIFF FINAL  


 


Differential Comment   


 


Blood Urea Nitrogen 18 MG/DL  


 


Creatinine 1.06 MG/DL  


 


Random Glucose 253 MG/DL  


 


Calcium Level 9.7 MG/DL  


 


Sodium Level 135 MEQ/L  


 


Potassium Level 3.8 MEQ/L  


 


Chloride Level 101 MEQ/L  


 


Carbon Dioxide Level 27.2 MEQ/L  


 


Anion Gap 7 MEQ/L  


 


Estimat Glomerular Filtration


Rate 58 ML/MIN 


  


 


 


Human Chorionic Gonadotropin,


Quant LESS THAN 1


MIU/ML 


 


 


Ethyl Alcohol Level


  LESS THAN 3


MG/DL 


 


 


Urine Color  YELLOW 


 


Urine Turbidity  CLEAR 


 


Urine pH  5.5 


 


Urine Specific Gravity


  


  GREATER THAN


1.050


 


Urine Protein  30 mg/dL 


 


Urine Glucose (UA)  1000 mg/dL 


 


Urine Ketones  NEG mg/dL 


 


Urine Occult Blood  NEG 


 


Urine Nitrite  POS 


 


Urine Bilirubin  NEG 


 


Urine Urobilinogen


  


  LESS THAN 2.0


MG/DL


 


Urine Leukocyte Esterase  NEG 


 


Urine RBC  1 /hpf 


 


Urine WBC


  


  LESS THAN 1


/hpf


 


Urine Squamous Epithelial


Cells 


  2 /hpf 


 


 


Urine Bacteria  FEW /hpf 


 


Urine Mucus  FEW /lpf 


 


Microscopic Urinalysis Comment


  


  CATH-CULTURE


IND











MDM


Medical Decision Making


Medical Screen Exam Complete:  Yes


Emergency Medical Condition:  Yes


Differential Diagnosis


contusions  vs  fracture  vs  muscle  spasm  vs   strain    head injury  

contusiion  vs  intracramial  bleed  skull frac other


Narrative Course


CT  head cervical  thoracic and  lumbral and  abdo  all negative  toradol and  

NS  and  dsicharge





Diagnosis





 Primary Impression:  


 Fall


 Qualified Codes:  W19.XXXA - Unspecified fall, initial encounter


 Additional Impression:  


 Back injury


 Qualified Codes:  S39.92XA - Unspecified injury of lower back, initial 

encounter


Scripts


Diazepam (Valium) 5 Mg Tab


5 MG PO TID Y for MUSCLE SPASM, #15 TAB 0 Refills


   Prov: Tawanda Kendrick MD         18 


Ibuprofen (Ibuprofen) 600 Mg Tab


600 MG PO Q6H Y for Pain/Inflammation, #40 TAB 0 Refills


   Prov: Tawanda Kendrick MD         18


Disposition:  01 DISCHARGE HOME


Condition:  Good











Tawanda Kendrick MD 2018 20:45

## 2018-02-22 NOTE — RADRPT
EXAM DATE/TIME:  2018 21:44 

 

[facility] COMPARISON:     

No previous studies available for comparison.

 

 

INDICATIONS :     

Low back pain due to fall.

                      

 

IV CONTRAST:     

97 cc Omnipaque 350 (iohexol) IV 

 

 

RADIATION DOSE:       

; Reconstructed from previous dataset, no dose

 

 

MEDICAL HISTORY :     

Diabetes mellitus type 2.  

 

SURGICAL HISTORY :      

 section. 

 

ENCOUNTER:      

Initial

 

ACUITY:      

1 day

 

PAIN SCALE:      

9/10

 

LOCATION:       

Bilateral  low back region.

 

TECHNIQUE:     

Volumetric scanning of the lumbar spine was performed.  Multiplanar reconstructions in the sagittal, 
coronal and oblique axial planes were performed.  Using automated exposure control and adjustment of 
the mA and/or kV according to patient size, radiation dose was kept as low as reasonably achievable t
o obtain optimal diagnostic quality images.  DICOM format image data is available electronically for 
review and comparison.  

 

FINDINGS:     

 

CONUS MEDULLARIS:     

Normal.

 

PARASPINAL SOFT TISSUES:     

Normal.

 

LUMBAR CORD:     

Normal.

 

DURAL SAC:     

Normal.

 

L1-L2:  The disc, uncovertebral joints, central canal, foramina, and facets are normal.

 

L2-L3:  The disc, uncovertebral joints, central canal, foramina, and facets are normal.

 

L3-L4: 

Minimal diffuse disc bulge without central canal or neural foraminal stenosis.

 

 

 

L5

 

CONCLUSION:     

1. No acute fracture or subluxation.

2. Degenerative spondylosis of the lower lumbar spine most prominently at L4-S1. No significant centr
al canal or neural foraminal narrowing.

 

 

 

 Gerson Hussein MD on 2018 at 22:16           

Board Certified Radiologist.

 This report was verified electronically.

## 2018-02-22 NOTE — RADRPT
EXAM DATE/TIME:  2018 21:44 

 

[facility] COMPARISON:     

No previous studies available for comparison.

 

 

INDICATIONS :     

Chest pain due to fall

                      

 

IV CONTRAST:     

97 cc Omnipaque 350 (iohexol) IV 

 

 

RADIATION DOSE:     

7.11 CTDIvol (mGy) ; Combined studies - Thorax/Abdomen/Pelvis

 

 

MEDICAL HISTORY :     

Diabetes mellitus type 2.  

 

SURGICAL HISTORY :      

 section. 

 

ENCOUNTER:      

Initial

 

ACUITY:      

1 day

 

PAIN SCALE:      

9/10

 

LOCATION:       

Bilateral chest 

 

TECHNIQUE:      

Volumetric scanning of the chest was performed.  Using automated exposure control and adjustment of t
he mA and/or kV according to patient size, radiation dose was kept as low as reasonably achievable to
 obtain optimal diagnostic quality images.   DICOM format image data is available electronically for 
review and comparison.  

 

Follow-up recommendations for detected pulmonary nodules are based at a minimum on nodule size and pa
tient risk factors according to Fleischner Society Guidelines.

 

FINDINGS:     

 

LUNGS:     

There is no consolidation or pneumothorax.  No concerning pulmonary nodule is visualized.

 

PLEURA:     

There is no pleural thickening or pleural effusion.

 

MEDIASTINUM:     

The heart and great vessels demonstrate no acute abnormality.  There is no mediastinal or hilar lymph
adenopathy.

 

AXILLAE:     

Within normal limits.  No lymphadenopathy.

 

SKELETAL:     

Osseous structures appear intact without acute bony fracture.

 

MISCELLANEOUS:     

The visualized upper abdominal organs demonstrate no acute abnormality.

 

CONCLUSION:     

1. No CT evidence for acute traumatic injury in the chest.

 

 

 

 Gerson Hussein MD on 2018 at 22:06           

Board Certified Radiologist.

 This report was verified electronically.

## 2018-02-22 NOTE — RADRPT
EXAM DATE/TIME:  2018 21:44 

 

[facility] COMPARISON:     

No previous studies available for comparison.

 

 

INDICATIONS :     

Mid back pain due to fall.

                      

 

IV CONTRAST:     

97 cc Omnipaque 350 (iohexol) IV 

 

 

RADIATION DOSE:       

; Reconstructed from previous dataset, no dose

 

 

MEDICAL HISTORY :     

Diabetes mellitus type 2.  

 

SURGICAL HISTORY :      

 section. 

 

ENCOUNTER:      

Initial

 

ACUITY:      

1 day

 

PAIN SCALE:      

9/10

 

LOCATION:       

Bilateral  mid back.

 

TECHNIQUE:     

Volumetric scanning of the thoracic spine was performed.  Multiplanar reconstructions in the sagittal
, coronal and oblique axial planes were performed.  Using automated exposure control and adjustment o
f the mA and/or kV according to patient size, radiation dose was kept as low as reasonably achievable
 to obtain optimal diagnostic quality images.  DICOM format image data is available electronically fo
r review and comparison.  

 

FINDINGS:     

The vertebral bodies of the thoracic spine are in normal alignment without evidence of subluxation.  
Vertebral body height is maintained.  No fractures are seen.

 

T1-T2:     

Normal.

 

T2-T3:     

The thecal sac has a normal diameter.  No evidence of disc bulge or protrusion.

 

T3-T4:     

The thecal sac has a normal diameter.  No evidence of disc bulge or protrusion.

 

T4-T5:     

The thecal sac has a normal diameter.  No evidence of disc bulge or protrusion.

 

T5-T6:     

The thecal sac has a normal diameter.  No evidence of disc bulge or protrusion.

 

T6-T7:     

The thecal sac has a normal diameter.  No evidence of disc bulge or protrusion.

 

T7-T8:     

The thecal sac has a normal diameter.  No evidence of disc bulge or protrusion.

 

T8-T9:     

The thecal sac has a normal diameter.  No evidence of disc bulge or protrusion.

 

T9-T10:   

The thecal sac has a normal diameter.  No evidence of disc bulge or protrusion.

 

T10-T11: 

Diffuse disc bulge with increased density in the posterior disc space. Mild facet arthropathy. Mild a
ssociated central canal narrowing. No significant neural foraminal stenosis.

 

T11-T12: 

The thecal sac has a normal diameter.  No evidence of disc bulge or protrusion.

 

T12-L1:   

The thecal sac has a normal diameter.  No evidence of disc bulge or protrusion.

 

CONCLUSION:     

1. No acute fracture or subluxation.

2. Mild degenerative change at T10-11. 

 

 

 Gerson Hussein MD on 2018 at 22:14           

Board Certified Radiologist.

 This report was verified electronically.

## 2018-02-23 VITALS — SYSTOLIC BLOOD PRESSURE: 122 MMHG | TEMPERATURE: 98.1 F | DIASTOLIC BLOOD PRESSURE: 76 MMHG

## 2018-02-23 LAB
BACTERIA #/AREA URNS HPF: (no result) /HPF
COLOR UR: YELLOW
GLUCOSE UR STRIP-MCNC: 1000 MG/DL
HGB UR QL STRIP: (no result)
KETONES UR STRIP-MCNC: (no result) MG/DL
MUCOUS THREADS #/AREA URNS LPF: (no result) /LPF
NITRITE UR QL STRIP: (no result)
SP GR UR STRIP: (no result) (ref 1–1.03)
SQUAMOUS #/AREA URNS HPF: 2 /HPF (ref 0–5)
URINE LEUKOCYTE ESTERASE: (no result)

## 2018-05-28 ENCOUNTER — HOSPITAL ENCOUNTER (EMERGENCY)
Dept: HOSPITAL 17 - NEPD | Age: 38
Discharge: HOME | End: 2018-05-28
Payer: MEDICAID

## 2018-05-28 VITALS
TEMPERATURE: 98.9 F | DIASTOLIC BLOOD PRESSURE: 96 MMHG | RESPIRATION RATE: 20 BRPM | HEART RATE: 105 BPM | OXYGEN SATURATION: 99 % | SYSTOLIC BLOOD PRESSURE: 161 MMHG

## 2018-05-28 VITALS — BODY MASS INDEX: 27.68 KG/M2 | WEIGHT: 176.37 LBS | HEIGHT: 67 IN

## 2018-05-28 DIAGNOSIS — E11.9: ICD-10-CM

## 2018-05-28 DIAGNOSIS — F32.9: ICD-10-CM

## 2018-05-28 DIAGNOSIS — F17.200: ICD-10-CM

## 2018-05-28 DIAGNOSIS — J40: Primary | ICD-10-CM

## 2018-05-28 DIAGNOSIS — F41.9: ICD-10-CM

## 2018-05-28 LAB
ALBUMIN SERPL-MCNC: 3.9 GM/DL (ref 3.4–5)
ALP SERPL-CCNC: 135 U/L (ref 45–117)
ALT SERPL-CCNC: 64 U/L (ref 10–53)
AST SERPL-CCNC: 38 U/L (ref 15–37)
BASOPHILS # BLD AUTO: 0.1 TH/MM3 (ref 0–0.2)
BASOPHILS NFR BLD: 0.7 % (ref 0–2)
BILIRUB SERPL-MCNC: 0.4 MG/DL (ref 0.2–1)
BUN SERPL-MCNC: 9 MG/DL (ref 7–18)
CALCIUM SERPL-MCNC: 9.4 MG/DL (ref 8.5–10.1)
CHLORIDE SERPL-SCNC: 102 MEQ/L (ref 98–107)
CREAT SERPL-MCNC: 0.97 MG/DL (ref 0.5–1)
EOSINOPHIL # BLD: 0.3 TH/MM3 (ref 0–0.4)
EOSINOPHIL NFR BLD: 2.2 % (ref 0–4)
ERYTHROCYTE [DISTWIDTH] IN BLOOD BY AUTOMATED COUNT: 12.7 % (ref 11.6–17.2)
GFR SERPLBLD BASED ON 1.73 SQ M-ARVRAT: 65 ML/MIN (ref 89–?)
GLUCOSE SERPL-MCNC: 214 MG/DL (ref 74–106)
HCO3 BLD-SCNC: 27.5 MEQ/L (ref 21–32)
HCT VFR BLD CALC: 42.7 % (ref 35–46)
HGB BLD-MCNC: 14.5 GM/DL (ref 11.6–15.3)
LYMPHOCYTES # BLD AUTO: 2.7 TH/MM3 (ref 1–4.8)
LYMPHOCYTES NFR BLD AUTO: 18.8 % (ref 9–44)
MCH RBC QN AUTO: 31.7 PG (ref 27–34)
MCHC RBC AUTO-ENTMCNC: 34.1 % (ref 32–36)
MCV RBC AUTO: 93 FL (ref 80–100)
MONOCYTE #: 1 TH/MM3 (ref 0–0.9)
MONOCYTES NFR BLD: 6.6 % (ref 0–8)
NEUTROPHILS # BLD AUTO: 10.4 TH/MM3 (ref 1.8–7.7)
NEUTROPHILS NFR BLD AUTO: 71.7 % (ref 16–70)
PLATELET # BLD: 253 TH/MM3 (ref 150–450)
PMV BLD AUTO: 9 FL (ref 7–11)
PROT SERPL-MCNC: 7.6 GM/DL (ref 6.4–8.2)
RBC # BLD AUTO: 4.59 MIL/MM3 (ref 4–5.3)
SODIUM SERPL-SCNC: 139 MEQ/L (ref 136–145)
WBC # BLD AUTO: 14.5 TH/MM3 (ref 4–11)

## 2018-05-28 PROCEDURE — 87880 STREP A ASSAY W/OPTIC: CPT

## 2018-05-28 PROCEDURE — 80053 COMPREHEN METABOLIC PANEL: CPT

## 2018-05-28 PROCEDURE — 85025 COMPLETE CBC W/AUTO DIFF WBC: CPT

## 2018-05-28 PROCEDURE — 87804 INFLUENZA ASSAY W/OPTIC: CPT

## 2018-05-28 PROCEDURE — 96360 HYDRATION IV INFUSION INIT: CPT

## 2018-05-28 PROCEDURE — 71046 X-RAY EXAM CHEST 2 VIEWS: CPT

## 2018-05-28 PROCEDURE — 99284 EMERGENCY DEPT VISIT MOD MDM: CPT

## 2018-05-28 PROCEDURE — 87081 CULTURE SCREEN ONLY: CPT

## 2018-05-28 NOTE — PD
HPI


Chief Complaint:  Cold / Flu Symptoms


Time Seen by Provider:  19:04


Travel History


International Travel<30 days:  No


Contact w/Intl Traveler<30days:  No


Traveled to known affect area:  No





History of Present Illness


HPI


Patient is a 37-year-old female presenting to emerge department for evaluation 

of sore throat, body aches, fevers, cough, chest congestion and nasal 

congestion.  Patient states her symptoms started 3 days ago.  She reports that 

she feels short of breath.  Cough is nonproductive.  She states that her fevers 

have been as high as 103.  She took TheraFlu 2 hours prior to arrival.  Patient 

denies any nausea, vomiting, abdominal pain, chest pain.  Symptom onset was 

gradual, symptoms are moderate and constant in nature.  She reports a history 

of type 2 diabetes, she is a current smoker.





[facility]


Past Medical History


Hx Anticoagulant Therapy:  No


Anxiety:  Yes


Depression:  Yes


Diabetes:  Yes


Immunizations Current:  Yes


Tetanus Vaccination:  > 5 Years


Influenza Vaccination:  Yes


Pregnant?:  Not Pregnant


LMP:  


:  1


Para:  1





Past Surgical History


Surgical History:  No Previous Surgery


 Section:  Yes





Social History


Alcohol Use:  Yes (Rarely)


Tobacco Use:  Yes ( PPD)


Substance Use:  No





Allergies-Medications


(Allergen,Severity, Reaction):  


Coded Allergies:  


     latex (Unverified  Allergy, Severe, 18)


Reported Meds & Prescriptions





Reported Meds & Active Scripts


Active


Klonopin (Clonazepam) 0.5 Mg Tab 0.5 Mg PO BID


Januvia (Sitagliptin Phosphate) 100 Mg Tab 100 Mg PO DAILY


Reported


Prozac (Fluoxetine HCl) 20 Mg Cap 20 Mg PO DAILY








Review of Systems


Except as stated in HPI:  all other systems reviewed are Neg


General / Constitutional:  Positive: Fever, Chills


HENT:  Positive: Headaches, Congestion


Cardiovascular:  No: Chest Pain or Discomfort


Respiratory:  Positive: Cough, Shortness of Breath


Gastrointestinal:  No: Nausea, Vomiting, Abdominal Pain


Musculoskeletal:  Positive: Myalgias


Neurologic:  No: Weakness, Dizziness, Syncope





Physical Exam


Narrative


GENERAL: Well-developed, well-nourished, alert  female.  Presenting in 

no acute distress.


SKIN: Warm and dry.


HEAD: Atraumatic. Normocephalic. 


EYES: Pupils equal and round. No scleral icterus. No injection or drainage. 


ENT: No nasal bleeding or discharge.  Mucous membranes pink and moist.  

Posterior pharynx is erythematous with cobblestone appearance.  No tonsillar 

hypertrophy noted.  No exudates noted.


NECK: Trachea midline. No JVD. 


CARDIOVASCULAR: Regular rate and rhythm.  


RESPIRATORY: No accessory muscle use.  Coarse breath sounds in bases, more so 

on the right than the left.


GASTROINTESTINAL: Abdomen soft, non-tender, nondistended. Hepatic and splenic 

margins not palpable. 


MUSCULOSKELETAL: Extremities without clubbing, cyanosis, or edema. No obvious 

deformities. 


NEUROLOGICAL: Awake and alert. No obvious cranial nerve deficits.  Motor 

grossly within normal limits. Five out of 5 muscle strength in the arms and 

legs.  Normal speech.


PSYCHIATRIC: Appropriate mood and affect; insight and judgment normal.





Data


Data


Last Documented VS





Vital Signs








  Date Time  Temp Pulse Resp B/P (MAP) Pulse Ox O2 Delivery O2 Flow Rate FiO2


 


18 18:30 98.9 105 20 115/69 (84) 99   








Orders





 Orders


Complete Blood Count With Diff (18 19:12)


Comprehensive Metabolic Panel (18 19:12)


Group A Rapid Strep Screen (18 19:12)


Influenzae A/B Antigen (18 19:12)


Ecg Monitoring (18 19:12)


Iv Access Insert/Monitor (18 19:12)


Oximetry (18 19:12)


Sodium Chloride 0.9% Flush (Ns Flush) (18 19:15)


Sodium Chlor 0.9% 1000 Ml Inj (Ns 1000 M (18 19:15)


Chest, Pa & Lat (18 )


Strep Culture (Group A) (18 19:22)





Labs





Laboratory Tests








Test


  18


19:38


 


White Blood Count 14.5 TH/MM3 


 


Red Blood Count 4.59 MIL/MM3 


 


Hemoglobin 14.5 GM/DL 


 


Hematocrit 42.7 % 


 


Mean Corpuscular Volume 93.0 FL 


 


Mean Corpuscular Hemoglobin 31.7 PG 


 


Mean Corpuscular Hemoglobin


Concent 34.1 % 


 


 


Red Cell Distribution Width 12.7 % 


 


Platelet Count 253 TH/MM3 


 


Mean Platelet Volume 9.0 FL 


 


Neutrophils (%) (Auto) 71.7 % 


 


Lymphocytes (%) (Auto) 18.8 % 


 


Monocytes (%) (Auto) 6.6 % 


 


Eosinophils (%) (Auto) 2.2 % 


 


Basophils (%) (Auto) 0.7 % 


 


Neutrophils # (Auto) 10.4 TH/MM3 


 


Lymphocytes # (Auto) 2.7 TH/MM3 


 


Monocytes # (Auto) 1.0 TH/MM3 


 


Eosinophils # (Auto) 0.3 TH/MM3 


 


Basophils # (Auto) 0.1 TH/MM3 


 


CBC Comment DIFF FINAL 


 


Differential Comment  


 


Blood Urea Nitrogen 9 MG/DL 


 


Creatinine 0.97 MG/DL 


 


Random Glucose 214 MG/DL 


 


Total Protein 7.6 GM/DL 


 


Albumin 3.9 GM/DL 


 


Calcium Level 9.4 MG/DL 


 


Alkaline Phosphatase 135 U/L 


 


Aspartate Amino Transf


(AST/SGOT) 38 U/L 


 


 


Alanine Aminotransferase


(ALT/SGPT) 64 U/L 


 


 


Total Bilirubin 0.4 MG/DL 


 


Sodium Level 139 MEQ/L 


 


Potassium Level 4.0 MEQ/L 


 


Chloride Level 102 MEQ/L 


 


Carbon Dioxide Level 27.5 MEQ/L 


 


Anion Gap 10 MEQ/L 


 


Estimat Glomerular Filtration


Rate 65 ML/MIN 


 











MDM


Medical Decision Making


Medical Screen Exam Complete:  Yes


Emergency Medical Condition:  Yes


Interpretation(s)





Last Impressions








Chest X-Ray 18 0000 Signed





Impressions: 





 CONCLUSION: 





 No active disease.





  





 








Laboratory Tests








Test


  18


19:38


 


White Blood Count 14.5 TH/MM3 


 


Red Blood Count 4.59 MIL/MM3 


 


Hemoglobin 14.5 GM/DL 


 


Hematocrit 42.7 % 


 


Mean Corpuscular Volume 93.0 FL 


 


Mean Corpuscular Hemoglobin 31.7 PG 


 


Mean Corpuscular Hemoglobin


Concent 34.1 % 


 


 


Red Cell Distribution Width 12.7 % 


 


Platelet Count 253 TH/MM3 


 


Mean Platelet Volume 9.0 FL 


 


Neutrophils (%) (Auto) 71.7 % 


 


Lymphocytes (%) (Auto) 18.8 % 


 


Monocytes (%) (Auto) 6.6 % 


 


Eosinophils (%) (Auto) 2.2 % 


 


Basophils (%) (Auto) 0.7 % 


 


Neutrophils # (Auto) 10.4 TH/MM3 


 


Lymphocytes # (Auto) 2.7 TH/MM3 


 


Monocytes # (Auto) 1.0 TH/MM3 


 


Eosinophils # (Auto) 0.3 TH/MM3 


 


Basophils # (Auto) 0.1 TH/MM3 


 


CBC Comment DIFF FINAL 


 


Differential Comment  


 


Blood Urea Nitrogen 9 MG/DL 


 


Creatinine 0.97 MG/DL 


 


Random Glucose 214 MG/DL 


 


Total Protein 7.6 GM/DL 


 


Albumin 3.9 GM/DL 


 


Calcium Level 9.4 MG/DL 


 


Alkaline Phosphatase 135 U/L 


 


Aspartate Amino Transf


(AST/SGOT) 38 U/L 


 


 


Alanine Aminotransferase


(ALT/SGPT) 64 U/L 


 


 


Total Bilirubin 0.4 MG/DL 


 


Sodium Level 139 MEQ/L 


 


Potassium Level 4.0 MEQ/L 


 


Chloride Level 102 MEQ/L 


 


Carbon Dioxide Level 27.5 MEQ/L 


 


Anion Gap 10 MEQ/L 


 


Estimat Glomerular Filtration


Rate 65 ML/MIN 


 








Vital Signs








  Date Time  Temp Pulse Resp B/P (MAP) Pulse Ox O2 Delivery O2 Flow Rate FiO2


 


18 18:30 98.9 105 20 115/69 (84) 99   








Differential Diagnosis


Influenza versus strep pharyngitis versus pneumonia versus bronchitis versus 

URI versus other


Narrative Course


Patient is a 37-year-old female presenting for evaluation of cold and flulike 

symptoms for the last 4 days.  Patient is mildly tachycardic on arrival, she is 

afebrile currently but took TheraFlu 2 hours prior to arrival.  Labs and 

imaging ordered and pending.


CBC with white blood count of 14.5, chemistry is unremarkable, influenza and 

strep are negative.  Chest x-ray shows no acute disease.  Symptoms are likely 

viral in nature however patient is a smoker and will be started on azithromycin 

at this time.  She will be given a prescription for albuterol inhaler as well.  

Patient was encouraged to follow-up with her primary doctor, continue symptom 

management with Tylenol or ibuprofen as needed as directed for body aches or 

fevers.  She is encouraged to return to emergency department any new or 

worsening symptoms.  Patient verbalized understanding of these instructions.  

Patient stable for discharge.





Diagnosis





 Primary Impression:  


 Bronchitis


Referrals:  


Primary Care Physician


3 days


Patient Instructions:  Acute Bronchitis (ED), General Instructions





***Additional Instructions:  


Follow-up with your primary doctor


Take medications as directed


Continue symptom management, take over-the-counter acetaminophen or ibuprofen 

as needed and as directed for fevers and body aches


Increase fluid intake


Return to emergency department for any new or worsening symptoms


***Med/Other Pt SpecificInfo:  Prescription(s) given


Scripts


Albuterol 18 GM Inh (Ventolin Hfa 18 GM Inh) 90 Mcg/Act Aer


2 PUFF INH Q4-6H Y for SHORTNESS OF BREATH, #1 INHALER 0 Refills


   Prov: Lisa Moreno         18 


Ibuprofen (Ibuprofen) 800 Mg Tab


800 MG PO Q6HR Y for PAIN, #40 TAB 0 Refills


   Prov: Lisa Moreno         18 


Azithromycin (Azithromycin) 250 Mg Tab


250 MG PO AS DIRECTED for Infection, #6 TAB 0 Refills


   Take 2 tabs (500 mg) on day 1 then 1 tab daily x 4 days.


   Prov: Lisa Moreno         18


Disposition:  01 DISCHARGE HOME


Condition:  Stable











Lisa Moreno May 28, 2018 19:19

## 2018-05-28 NOTE — RADRPT
EXAM DATE:  5/28/2018 7:36 PM EDT

AGE/SEX:        37 years / Female



INDICATIONS:  Cough, shortness of breath and chest pain.



CLINICAL DATA:  This is the patient's initial encounter. Patient reports that signs and symptoms have
 been present for 4 - 6 days and indicates a pain score of 4/10. 

                                                                          

MEDICAL/SURGICAL HISTORY:       Diabetes mellitus type II. None.



COMPARISON:      [facility], CHEST PA & LAT, 4/14/2017.  .



FINDINGS:  

PA and lateral views of the chest demonstrate the lungs to be symmetrically aerated without evidence 
of mass, infiltrate or effusion. The cardiomediastinal contours are unremarkable. Osseous structures 
are intact.



CONCLUSION: 

No active disease.



Electronically signed by: Randall Cr MD  5/28/2018 7:59 PM EDT

## 2020-01-12 NOTE — EKG
Date Performed: 07/12/2017       Time Performed: 11:24:17

 

PTAGE:      36 years

 

EKG:      Sinus rhythm 

 

 NORMAL ECG 

 

NO PREVIOUS TRACING            

 

DOCTOR:   Emily Fenton  Interpretating Date/Time  07/12/2017 16:15:44
Home

## 2022-04-22 NOTE — RADHPO
EXAM DATE/TIME:  2017 11:23 

 

[facility] COMPARISON:     

No previous studies available for comparison.

 

 

INDICATIONS :     

Lower abdominal pain and nausea since yesterday.

                  

 

ORAL CONTRAST:      

No oral contrast ingested.

                  

 

RADIATION DOSE:     

9.51 CTDIvol (mGy) 

 

 

MEDICAL HISTORY :       

Diabetes.

 

SURGICAL HISTORY :      

 section. IUD.

 

ENCOUNTER:      

Initial

 

ACUITY:      

2 days

 

PAIN SCALE:      

3/10

 

LOCATION:       

Lower quadrant 

 

TECHNIQUE:     

Volumetric scanning of the abdomen and pelvis was performed.  Using automated exposure control and ad
justment of the mA and/or kV according to patient size, radiation dose was kept as low as reasonably 
achievable to obtain optimal diagnostic quality images. 

 

FINDINGS:     

The lung bases are clear.  There is no pericardial effusion.  The liver, spleen, pancreas and adrenal
 glands are unremarkable.  Right and left kidneys appear normal.

 

Region of the cecum and terminal ileum appear unremarkable.  There is a normal sized appendix.

 

Pelvic contents are normal.  IUD is in place. 

 

There is no evidence for diverticulitis.  Abdominal wall is intact.  There is no free fluid. 

 

Bowel gas pattern is unremarkable. 

 

CONCLUSION:     

1.  IUD in place.  

2.  I do not see an etiology for the patient's abdominal pain and nausea.

 

 Simon Morales MD FACR on 2017 at 11:42                

Board Certified Radiologist.

 This report was verified electronically. Satisfactory

## 2022-08-03 NOTE — RADRPT
EXAM DATE/TIME:  2018 21:44 

 

[facility] COMPARISON:     

No previous studies available for comparison.

 

 

INDICATIONS :     

Diffuse abdomen pain due to fall.

                      

 

IV CONTRAST:     

97 cc Omnipaque 350 (iohexol) IV 

 

 

ORAL CONTRAST:      

No oral contrast ingested.

                      

 

RADIATION DOSE:     

7.11 CTDIvol (mGy) ; Combined studies - Thorax/Abdomen/Pelvis

 

 

MEDICAL HISTORY :     

Diabetes mellitus type 2.  

 

SURGICAL HISTORY :      

 section. 

 

ENCOUNTER:      

Initial

 

ACUITY:      

1 day

 

PAIN SCALE:      

9/10

 

LOCATION:       

Bilateral lower quadrant 

 

TECHNIQUE:     

Volumetric scanning of the abdomen and pelvis was performed.  Using automated exposure control and ad
justment of the mA and/or kV according to patient size, radiation dose was kept as low as reasonably 
achievable to obtain optimal diagnostic quality images.  DICOM format image data is available electro
nically for review and comparison.  

 

FINDINGS:     

 

LOWER LUNGS:     

The visualized lower lungs are clear.

 

LIVER:     

Homogeneous density without lesion.  There is no dilation of the biliary tree.  No calcified gallston
es.

 

SPLEEN:     

Normal size without lesion.

 

PANCREAS:     

Within normal limits.

 

KIDNEYS:     

Normal in size and shape.  There is no mass, stone or hydronephrosis.

 

ADRENAL GLANDS:     

Within normal limits.

 

VASCULAR:     

There is no aortic aneurysm.

 

BOWEL/MESENTERY:     

The stomach, small bowel, and colon demonstrate no acute abnormality.  There is no free intraperitone
al air or fluid.

 

ABDOMINAL WALL:     

Within normal limits.

 

RETROPERITONEUM:     

There is no lymphadenopathy. 

 

BLADDER:     

No wall thickening or mass. 

 

REPRODUCTIVE:     

IUD in place.

 

INGUINAL:     

There is no lymphadenopathy or hernia. 

 

MUSCULOSKELETAL:     

Osseous structures are intact without acute bony fracture.

 

CONCLUSION:     

1. No CT evidence for acute traumatic injury in the abdomen or pelvis. 

 

 

 Gerson Hussein MD on 2018 at 22:04           

Board Certified Radiologist.

 This report was verified electronically. Three months S/P nasal reconstruction with caudal extension graft and bilateral osteotomies,septo,turbs.  He has had no further nasal bleeding since his last visit  Today his exam reveals his dorsum to be straight and well supported with slight intranasal edema and mild excoriation at the anterior aspect of his septum on the left side.  Plan:  Continue with copious nasal saline sprays.  RTC:  9 months or p.r.n. sooner.